# Patient Record
Sex: FEMALE | Race: WHITE | NOT HISPANIC OR LATINO | Employment: UNEMPLOYED | URBAN - METROPOLITAN AREA
[De-identification: names, ages, dates, MRNs, and addresses within clinical notes are randomized per-mention and may not be internally consistent; named-entity substitution may affect disease eponyms.]

---

## 2017-04-23 ENCOUNTER — HOSPITAL ENCOUNTER (OUTPATIENT)
Dept: OBGYN | Facility: HOSPITAL | Age: 22
Discharge: HOME | End: 2017-04-23
Attending: OBSTETRICS & GYNECOLOGY | Admitting: OBSTETRICS & GYNECOLOGY

## 2017-05-24 ENCOUNTER — RECORDS - HEALTHEAST (OUTPATIENT)
Dept: ADMINISTRATIVE | Facility: OTHER | Age: 22
End: 2017-05-24

## 2017-06-25 ENCOUNTER — HOSPITAL ENCOUNTER (OUTPATIENT)
Dept: OBGYN | Facility: HOSPITAL | Age: 22
Discharge: HOME | End: 2017-06-26
Attending: OBSTETRICS & GYNECOLOGY | Admitting: OBSTETRICS & GYNECOLOGY

## 2017-08-01 ENCOUNTER — RECORDS - HEALTHEAST (OUTPATIENT)
Dept: ADMINISTRATIVE | Facility: OTHER | Age: 22
End: 2017-08-01

## 2017-08-11 ASSESSMENT — MIFFLIN-ST. JEOR: SCORE: 2035.43

## 2017-08-13 ENCOUNTER — ANESTHESIA - HEALTHEAST (OUTPATIENT)
Dept: OBGYN | Facility: HOSPITAL | Age: 22
End: 2017-08-13

## 2017-08-14 ENCOUNTER — SURGERY - HEALTHEAST (OUTPATIENT)
Dept: OBGYN | Facility: HOSPITAL | Age: 22
End: 2017-08-14

## 2018-09-14 ENCOUNTER — RECORDS - HEALTHEAST (OUTPATIENT)
Dept: ADMINISTRATIVE | Facility: OTHER | Age: 23
End: 2018-09-14

## 2018-09-18 ASSESSMENT — MIFFLIN-ST. JEOR: SCORE: 1985.53

## 2018-09-19 ENCOUNTER — ANESTHESIA - HEALTHEAST (OUTPATIENT)
Dept: SURGERY | Facility: HOSPITAL | Age: 23
End: 2018-09-19

## 2018-09-19 ENCOUNTER — SURGERY - HEALTHEAST (OUTPATIENT)
Dept: SURGERY | Facility: HOSPITAL | Age: 23
End: 2018-09-19

## 2019-02-05 ENCOUNTER — RECORDS - HEALTHEAST (OUTPATIENT)
Dept: ADMINISTRATIVE | Facility: OTHER | Age: 24
End: 2019-02-05

## 2019-02-05 ASSESSMENT — MIFFLIN-ST. JEOR
SCORE: 2173.78
SCORE: 2178.32

## 2019-02-06 ASSESSMENT — MIFFLIN-ST. JEOR: SCORE: 2160.17

## 2019-02-07 ENCOUNTER — SURGERY - HEALTHEAST (OUTPATIENT)
Dept: OBGYN | Facility: HOSPITAL | Age: 24
End: 2019-02-07

## 2019-02-07 ENCOUNTER — ANESTHESIA - HEALTHEAST (OUTPATIENT)
Dept: OBGYN | Facility: HOSPITAL | Age: 24
End: 2019-02-07

## 2019-02-07 ASSESSMENT — MIFFLIN-ST. JEOR: SCORE: 2173.78

## 2019-03-04 ENCOUNTER — HOME CARE/HOSPICE - HEALTHEAST (OUTPATIENT)
Dept: HOME HEALTH SERVICES | Facility: HOME HEALTH | Age: 24
End: 2019-03-04

## 2020-12-06 ENCOUNTER — RECORDS - HEALTHEAST (OUTPATIENT)
Dept: ADMINISTRATIVE | Facility: OTHER | Age: 25
End: 2020-12-06

## 2020-12-06 ASSESSMENT — MIFFLIN-ST. JEOR: SCORE: 2296.25

## 2020-12-07 ENCOUNTER — COMMUNICATION - HEALTHEAST (OUTPATIENT)
Dept: SCHEDULING | Facility: CLINIC | Age: 25
End: 2020-12-07

## 2020-12-08 ENCOUNTER — ANESTHESIA - HEALTHEAST (OUTPATIENT)
Dept: OBGYN | Facility: HOSPITAL | Age: 25
End: 2020-12-08

## 2020-12-09 ENCOUNTER — SURGERY - HEALTHEAST (OUTPATIENT)
Dept: OBGYN | Facility: HOSPITAL | Age: 25
End: 2020-12-09

## 2021-05-31 VITALS — BODY MASS INDEX: 41.83 KG/M2 | WEIGHT: 276 LBS | HEIGHT: 68 IN

## 2021-06-02 VITALS — HEIGHT: 68 IN | WEIGHT: 265 LBS | BODY MASS INDEX: 40.16 KG/M2

## 2021-06-02 VITALS — HEIGHT: 67 IN | BODY MASS INDEX: 45.99 KG/M2 | WEIGHT: 293 LBS

## 2021-06-04 ENCOUNTER — AMBULATORY - HEALTHEAST (OUTPATIENT)
Dept: SURGERY | Facility: HOSPITAL | Age: 26
End: 2021-06-04

## 2021-06-04 ENCOUNTER — RECORDS - HEALTHEAST (OUTPATIENT)
Dept: ADMINISTRATIVE | Facility: OTHER | Age: 26
End: 2021-06-04

## 2021-06-04 DIAGNOSIS — Z11.59 ENCOUNTER FOR SCREENING FOR OTHER VIRAL DISEASES: ICD-10-CM

## 2021-06-05 VITALS — BODY MASS INDEX: 45.99 KG/M2 | HEIGHT: 67 IN | WEIGHT: 293 LBS

## 2021-06-10 NOTE — PROGRESS NOTES
Natalie presented to [facility] from home to R/O SROM at 21 weeks IUP. ROM plus is negative. Uterus palpates soft, non-tender. No visible fluid on perineum. 's. Dr. RAJINDER Claros notified of above assessments. Order received for discharge.

## 2021-06-11 NOTE — PROGRESS NOTES
Reported ultrasounds results to Dr. Claros at 1:25am. Kiera reports no contractions and none evident on monitor. Received orders for nifedipine 10mg PO every 4 hours as needed. Dr. lCaros to assess patient in AM.

## 2021-06-11 NOTE — PROGRESS NOTES
15 Kiera Iraheta  89    S: Pt presented last PM with q 3-5 min contractions. She also had an elevated blood pressure reading but hadn't taken her evening dose of labetolol. Pt is at 30w 2d.    O: Bp 130/70, P 106, T 98.4  Tracing: normal for 30 weeks 3d.  Pt given SQ terbutaline and then Nifedipine 20mg po and the contractions stopped. FFN was negative and Cx was 3.4cm on U/S.  ROM test negative.  UA showed a trace of protein.    Pt observed through the night and the contractions stopped.    Pelvic exam this AM: Cx is long and closed, -3.    A: Episode of PTL, now resolved. Chronic HTN, treated.    P: Home. RTC Wednesday (2 days.) Call if ROM, labor.    Kuldip Claros MD

## 2021-06-11 NOTE — PROGRESS NOTES
Dr. RAJINDER Claros at bedside for assessment.  MARYLIN per MD.   MD reviewed EFM strip.  Plan of care discussed.  Pt to be discharged to home and will followup with Dr. Claros on Wed as scheduled.  Pt states understanding to monitor fetal movement. And will call if an bleeding LOF, Ctx or concerns.

## 2021-06-12 NOTE — ANESTHESIA PREPROCEDURE EVALUATION
Anesthesia Evaluation      Patient summary reviewed     Airway   Mallampati: II   Pulmonary - negative ROS and normal exam                          Cardiovascular - normal exam  (+) hypertension, ,      Neuro/Psych - negative ROS     Endo/Other    (+) obesity, pregnant     GI/Hepatic/Renal - negative ROS           Dental                         Anesthesia Plan  Planned anesthetic: epidural    ASA 2     Anesthetic plan and risks discussed with: patient    Post-op plan: routine recovery

## 2021-06-12 NOTE — ANESTHESIA CARE TRANSFER NOTE
Last vitals:   Vitals:    08/14/17 0632   BP: 129/66   Pulse: (!) 116   Resp: 12   Temp: 36.9  C (98.4  F)   SpO2: 96%     Patient's level of consciousness is drowsy  Spontaneous respirations: yes  Maintains airway independently: yes  Dentition unchanged: yes  Oropharynx: oropharynx clear of all foreign objects    QCDR Measures:  ASA# 20 - Surgical Safety Checklist: WHO surgical safety checklist completed prior to induction  PQRS# 430 - Adult PONV Prevention: 4558F-8P - Pt did NOT receive => 2 anti-emetic agents  ASA# 8 - Peds PONV Prevention: NA - Not pediatric patient, not GA or 2 or more risk factors NOT present  PQRS# 424 - Hina-op Temp Management: 4559F - At least one body temp DOCUMENTED => 35.5C or 95.9F within required timeframe  PQRS# 426 - PACU Transfer Protocol: - Transfer of care checklist used  ASA# 14 - Acute Post-op Pain: ASA14B - Patient did NOT experience pain >= 7 out of 10

## 2021-06-12 NOTE — ANESTHESIA PROCEDURE NOTES
Epidural Block    Patient location during procedure: OB  Time Called: 8/13/2017 6:35 PM  Reason for Block:labor epidural  Staffing:  Performing  Anesthesiologist: MERI PARKER  Preanesthetic Checklist  Completed: patient identified, risks, benefits, and alternatives discussed, timeout performed, consent obtained, airway assessed, oxygen available, suction available, emergency drugs available and hand hygiene performed  Procedure  Patient position: sitting  Prep: Betadine  Patient monitoring: continuous pulse oximetry, heart rate and blood pressure  Approach: midline  Location: L3-L4  Injection technique: AR saline  Number of Attempts:1  Needle  Needle type: Hesham   Needle gauge: 18 G     Catheter in Space: 3  Assessment  Sensory level:  No complications

## 2021-06-12 NOTE — ANESTHESIA POSTPROCEDURE EVALUATION
Patient: Kiera Iraheta   SECTION  Anesthesia type: epidural    Patient location: OB room 012  Last vitals:   Vitals:    17 1640   BP: 119/65   Pulse: 96   Resp: 18   Temp: 36.7  C (98.1  F)   SpO2: 95%     Post vital signs: stable  Level of consciousness: alert and responsive; evidently had extensive repair per RN and epidural not turned off until 1530.  Pt satisfied with epidural for labor and then C-S.  Post-anesthesia pain: pain controlled  Post-anesthesia nausea and vomiting: no  Pulmonary: unassisted, return to baseline  Cardiovascular: stable and blood pressure at baseline  Hydration: adequate  Anesthetic events: no    QCDR Measures:  ASA# 11 - Hina-op Cardiac Arrest: ASA11B - Patient did NOT experience unanticipated cardiac arrest  ASA# 12 - Hina-op Mortality Rate: ASA12B - Patient did NOT die  ASA# 13 - PACU Re-Intubation Rate: ASA13B - Patient did NOT require a new airway mgmt  ASA# 10 - Composite Anes Safety: ASA10A - No serious adverse event  ASA# 38 - New Corneal Injury: ASA38A - No new exposure keratitis or corneal abrasion in PACU    Additional Notes:

## 2021-06-13 NOTE — ANESTHESIA PREPROCEDURE EVALUATION
Anesthesia Evaluation      Patient summary reviewed   History of anesthetic complications     Airway   Mallampati: III  Neck ROM: limited   Pulmonary - negative ROS and normal exam                          Cardiovascular - normal exam  Exercise tolerance: > or = 4 METS  (+) hypertension, ,      Neuro/Psych    (+) depression, anxiety/panic attacks,     Endo/Other    (+) obesity, pregnant     GI/Hepatic/Renal - negative ROS      Other findings: States only has HTN during pregnancies. 2 previous C-Sections.  May have had poorly functioning LE, and states SAB took a long time to be placed.  States she has been difficult to intubate.  PCOS.  Covid neg 12/7.  GFR and coags nl yesterday.  No pre-eclampsia. BMI 52+.        Dental - normal exam                        Anesthesia Plan  Planned anesthetic: epidural    ASA 3     Anesthetic plan and risks discussed with: patient and spouse    Post-op plan: routine recovery

## 2021-06-13 NOTE — ANESTHESIA PROCEDURE NOTES
Epidural Block    Patient location during procedure: OB  Time Called: 12/8/2020 8:52 PM  Reason for Block:labor epidural  Staffing:  Performing  Anesthesiologist: Jeff Henry MD  Preanesthetic Checklist  Completed: patient identified, risks, benefits, and alternatives discussed, timeout performed, consent obtained, airway assessed, oxygen available, suction available, emergency drugs available and hand hygiene performed  Procedure  Patient position: sitting  Prep: ChloraPrep  Patient monitoring: continuous pulse oximetry, heart rate and blood pressure  Approach: midline  Location: L2-L3  Epidural technique: AR to local (1.5% lido w/epi)  Number of Attempts:1  Needle  Needle type: Hesham   Needle gauge: 18 G     Catheter in Space: 5  Assessment  Sensory level:  No complicationsSensory level: not assessed at this time.

## 2021-06-13 NOTE — ANESTHESIA POSTPROCEDURE EVALUATION
Patient: Kiera Iraheta  Procedure(s):   SECTION  Anesthesia type: epidural    Patient location: Labor and Delivery  Last vitals:   Vitals Value Taken Time   /81 20 1534   Temp 37.4  C (99.3  F) 20 1534   Pulse 100 20 1553   Resp 16 20 1534   SpO2 96 % 20 1553   Vitals shown include unvalidated device data.  Post vital signs: stable  Level of consciousness: awake and responds to simple questions  Post-anesthesia pain: pain controlled  Post-anesthesia nausea and vomiting: no  Pulmonary: unassisted, return to baseline  Cardiovascular: stable and blood pressure at baseline  Hydration: adequate  Anesthetic events: no    QCDR Measures:  ASA# 11 - Hina-op Cardiac Arrest: ASA11B - Patient did NOT experience unanticipated cardiac arrest  ASA# 12 - Hina-op Mortality Rate: ASA12B - Patient did NOT die  ASA# 13 - PACU Re-Intubation Rate: ASA13B - Patient did NOT require a new airway mgmt  ASA# 10 - Composite Anes Safety: ASA10A - No serious adverse event    Additional Notes:

## 2021-06-13 NOTE — ANESTHESIA CARE TRANSFER NOTE
Last vitals:   Vitals:    12/09/20 0937   BP: 123/60   Pulse: 95   Resp: 12   Temp: 36.9  C (98.4  F)   SpO2: 93%     Patient's level of consciousness is awake and drowsy  Spontaneous respirations: yes  Maintains airway independently: yes  Dentition unchanged: yes  Oropharynx: oropharynx clear of all foreign objects    QCDR Measures:  ASA# 20 - Surgical Safety Checklist: WHO surgical safety checklist completed prior to induction    PQRS# 430 - Adult PONV Prevention: 4558F - Pt received => 2 anti-emetic agents (different classes) preop & intraop  ASA# 8 - Peds PONV Prevention: NA - Not pediatric patient, not GA or 2 or more risk factors NOT present  PQRS# 424 - Hina-op Temp Management: 4559F - At least one body temp DOCUMENTED => 35.5C or 95.9F within required timeframe  PQRS# 426 - PACU Transfer Protocol: - Transfer of care checklist used  ASA# 14 - Acute Post-op Pain: ASA14B - Patient did NOT experience pain >= 7 out of 10

## 2021-06-16 PROBLEM — O13.9 PIH (PREGNANCY INDUCED HYPERTENSION): Status: ACTIVE | Noted: 2017-08-02

## 2021-06-16 PROBLEM — O09.40 GESTATIONAL HYPERTENSION AFFECTING EIGHTH PREGNANCY: Status: ACTIVE | Noted: 2019-02-05

## 2021-06-16 PROBLEM — Z34.90 PREGNANT: Status: ACTIVE | Noted: 2020-12-09

## 2021-06-16 PROBLEM — O13.9 GESTATIONAL HYPERTENSION AFFECTING EIGHTH PREGNANCY: Status: ACTIVE | Noted: 2019-02-05

## 2021-06-16 PROBLEM — O13.9 GESTATIONAL HYPERTENSION: Status: ACTIVE | Noted: 2017-08-12

## 2021-06-16 PROBLEM — O13.9 GESTATIONAL HYPERTENSION AFFECTING THIRD PREGNANCY: Status: ACTIVE | Noted: 2020-12-07

## 2021-06-20 ENCOUNTER — HEALTH MAINTENANCE LETTER (OUTPATIENT)
Age: 26
End: 2021-06-20

## 2021-06-20 NOTE — ANESTHESIA POSTPROCEDURE EVALUATION
Patient: Kiera BALDERAS CERCLAGE  Anesthesia type: general    Patient location: PACU  Last vitals:   Vitals:    09/19/18 1400   BP: 141/69   Pulse: 84   Resp:    Temp:    SpO2: 95%     Post vital signs: stable  Level of consciousness: awake and responds to simple questions  Post-anesthesia pain: pain controlled  Post-anesthesia nausea and vomiting: no  Pulmonary: unassisted, return to baseline  Cardiovascular: stable and blood pressure at baseline  Hydration: adequate  Anesthetic events: no    QCDR Measures:  ASA# 11 - Hina-op Cardiac Arrest: ASA11B - Patient did NOT experience unanticipated cardiac arrest  ASA# 12 - Hina-op Mortality Rate: ASA12B - Patient did NOT die  ASA# 13 - PACU Re-Intubation Rate: ASA13B - Patient did NOT require a new airway mgmt  ASA# 10 - Composite Anes Safety: ASA10A - No serious adverse event    Additional Notes:

## 2021-06-20 NOTE — ANESTHESIA PREPROCEDURE EVALUATION
Anesthesia Evaluation      Patient summary reviewed   No history of anesthetic complications     Airway   Mallampati: III  Neck ROM: full   Pulmonary - negative ROS and normal exam                          Cardiovascular - normal exam  (+) hypertension, ,      Neuro/Psych    (+) depression, anxiety/panic attacks,     Endo/Other    (+) obesity (BMI 40), pregnant (Triplet pregnancy.  Hx previous . 13 weeks.)  (-) no diabetes     Comments: PCOS    GI/Hepatic/Renal - negative ROS   (-) GERD          Dental - normal exam                        Anesthesia Plan  Planned anesthetic: general endotracheal  No versed  Glidescope intubation  Assure neuromuscular blockade prior to intubation  Maintain muscle relaxation with zemuron  Zofran  Decadron 4 mg IV  ASA 3   Induction: intravenous   Anesthetic plan and risks discussed with: patient and spouse  Anesthesia plan special considerations: video-assisted, antiemetics,   Post-op plan: routine recovery

## 2021-06-20 NOTE — ANESTHESIA CARE TRANSFER NOTE
Last vitals:   Vitals:    09/19/18 1320   BP: 123/57   Pulse: 92   Resp: 27   Temp:    SpO2: 94%     Patient's level of consciousness is awake  Spontaneous respirations: yes  Maintains airway independently: yes  Dentition unchanged: yes  Oropharynx: oropharynx clear of all foreign objects    QCDR Measures:  ASA# 20 - Surgical Safety Checklist: WHO surgical safety checklist completed prior to induction  PQRS# 430 - Adult PONV Prevention: 4558F - Pt received => 2 anti-emetic agents (different classes) preop & intraop  ASA# 8 - Peds PONV Prevention: NA - Not pediatric patient, not GA or 2 or more risk factors NOT present  PQRS# 424 - Hina-op Temp Management: 4559F - At least one body temp DOCUMENTED => 35.5C or 95.9F within required timeframe  PQRS# 426 - PACU Transfer Protocol: - Transfer of care checklist used  ASA# 14 - Acute Post-op Pain: ASA14B - Patient did NOT experience pain >= 7 out of 10

## 2021-06-23 NOTE — ANESTHESIA PROCEDURE NOTES
Peripheral Block    Patient location during procedure: OR  Start time: 2/7/2019 7:00 PM  End time: 2/7/2019 7:08 PM  post-op analgesia per surgeon order as noted in medical record  Staffing:  Performing  Anesthesiologist: Pramod Jovel MD  Performing CRNA: Claudia Zaidi CRNA  Preanesthetic Checklist  Completed: patient identified, site marked, risks, benefits, and alternatives discussed, timeout performed, consent obtained, at patient's request, airway assessed, oxygen available, suction available, emergency drugs available and hand hygiene performed  Peripheral Block  Block type: other, TAP  Prep: ChloraPrep  Patient position: supine  Patient monitoring: cardiac monitor, continuous pulse oximetry, heart rate and blood pressure  Laterality: bilateral  Injection technique: ultrasound guided            Needle  Needle type: Stimuplex   Needle gauge: 22 G  Needle length: 6 in  no peripheral nerve catheter placed  Assessment  Injection assessment: no difficulty with injection, incremental injection, negative aspiration for heme and no paresthesia on injection  Additional Notes  onofre well

## 2021-06-23 NOTE — ANESTHESIA PREPROCEDURE EVALUATION
Anesthesia Evaluation      Patient summary reviewed     Airway   Mallampati: II  Neck ROM: full   Pulmonary - negative ROS    breath sounds clear to auscultation                         Cardiovascular   Rhythm: regular  Rate: normal,         Neuro/Psych - negative ROS     Endo/Other    (+) obesity,      GI/Hepatic/Renal       Other findings: PONV, TRIPLETS      Dental - normal exam                        Anesthesia Plan  Planned anesthetic: spinal  Tap block if requested, TXA  ASA 3     Anesthetic plan and risks discussed with: patient  Anesthesia plan special considerations: IV therapy two IVs,   Post-op plan: routine recovery

## 2021-06-23 NOTE — ANESTHESIA POSTPROCEDURE EVALUATION
Patient: Kiera Iraheta   SECTION, REPEAT  Anesthesia type: spinal    Patient location: Labor and Delivery  Last vitals:   Vitals:    19 1255   BP: 135/76   Pulse: 92   Resp: 18   Temp: 36.6  C (97.8  F)   SpO2: 95%     Post vital signs: stable  Level of consciousness: awake and responds to simple questions  Post-anesthesia pain: pain controlled  Post-anesthesia nausea and vomiting: no  Pulmonary: unassisted  Cardiovascular: stable  Hydration: adequate  Anesthetic events: no    QCDR Measures:  ASA# 11 - Hina-op Cardiac Arrest: ASA11B - Patient did NOT experience unanticipated cardiac arrest  ASA# 12 - Hina-op Mortality Rate: ASA12B - Patient did NOT die  ASA# 13 - PACU Re-Intubation Rate: NA - No ETT / LMA used for case  ASA# 10 - Composite Anes Safety: ASA10A - No serious adverse event    Additional Notes:

## 2021-06-23 NOTE — ANESTHESIA CARE TRANSFER NOTE
Last vitals:   Vitals:    02/07/19 1928   BP: 141/87   Pulse: 94   Resp: 12   Temp: 36.6  C (97.9  F)   SpO2: 95%     Patient's level of consciousness is drowsy  Spontaneous respirations: yes  Maintains airway independently: yes  Dentition unchanged: yes  Oropharynx: oropharynx clear of all foreign objects    QCDR Measures:  ASA# 20 - Surgical Safety Checklist: WHO surgical safety checklist completed prior to induction    PQRS# 430 - Adult PONV Prevention: 4558F - Pt received => 2 anti-emetic agents (different classes) preop & intraop  ASA# 8 - Peds PONV Prevention: NA - Not pediatric patient, not GA or 2 or more risk factors NOT present  PQRS# 424 - Hina-op Temp Management: 4559F - At least one body temp DOCUMENTED => 35.5C or 95.9F within required timeframe  PQRS# 426 - PACU Transfer Protocol: - Transfer of care checklist used  ASA# 14 - Acute Post-op Pain: ASA14B - Patient did NOT experience pain >= 7 out of 10

## 2021-06-23 NOTE — ANESTHESIA PROCEDURE NOTES
Spinal Block    Patient location during procedure: OR  Start time: 2/7/2019 5:16 PM  End time: 2/7/2019 5:46 PM  Reason for block: primary anesthetic      Preanesthetic Checklist  Completed: patient identified, risks, benefits, and alternatives discussed, timeout performed, consent obtained, at patient's request, airway assessed, oxygen available, suction available, emergency drugs available and hand hygiene performed  Spinal Block  Patient position: sitting  Prep: ChloraPrep  Patient monitoring: heart rate, cardiac monitor, continuous pulse ox and blood pressure  Approach: right paramedian  Location: L4-5  Injection technique: single-shot  Needle type: pencil-tip   Needle gauge: 22 G    Assessment  Sensory level: T8    Additional Notes:  Difficult placement secundary to anatomy and size, onofre well.

## 2021-06-26 ENCOUNTER — HOSPITAL ENCOUNTER (OUTPATIENT)
Facility: HOSPITAL | Age: 26
End: 2021-06-26
Attending: OBSTETRICS & GYNECOLOGY | Admitting: OBSTETRICS & GYNECOLOGY
Payer: COMMERCIAL

## 2021-06-27 DIAGNOSIS — Z11.59 ENCOUNTER FOR SCREENING FOR OTHER VIRAL DISEASES: ICD-10-CM

## 2021-07-03 NOTE — ADDENDUM NOTE
Addendum Note by Eleuterio Acevedo MD at 8/17/2017  8:47 AM     Author: Eleuterio Acevedo MD Service: -- Author Type: Physician    Filed: 8/17/2017  8:47 AM Date of Service: 8/17/2017  8:47 AM Status: Signed    : Eleuterio Acevedo MD (Physician)       Addendum  created 08/17/17 0847 by Eleuterio Acevedo MD    Anesthesia Intra Blocks edited, LDA updated via procedure documentation, Sign clinical note

## 2021-07-03 NOTE — ADDENDUM NOTE
Addendum Note by Eleuterio Acevedo MD at 8/17/2017  8:32 AM     Author: Eleuterio Acevedo MD Service: -- Author Type: Physician    Filed: 8/17/2017  8:32 AM Date of Service: 8/17/2017  8:32 AM Status: Signed    : Eleuterio Acevedo MD (Physician)       Addendum  created 08/17/17 0832 by Eleuterio Acevedo MD    Anesthesia Intra Blocks edited, Child order released for a procedure order, LDA created via procedure documentation, Sign clinical note

## 2021-07-23 ENCOUNTER — MEDICAL CORRESPONDENCE (OUTPATIENT)
Dept: HEALTH INFORMATION MANAGEMENT | Facility: CLINIC | Age: 26
End: 2021-07-23
Payer: COMMERCIAL

## 2021-10-10 ENCOUNTER — HEALTH MAINTENANCE LETTER (OUTPATIENT)
Age: 26
End: 2021-10-10

## 2022-01-28 LAB
ABO (EXTERNAL): NORMAL
ABO (EXTERNAL): NORMAL
HEMOGLOBIN (EXTERNAL): 13.7 G/DL (ref 11.7–15.5)
HEPATITIS B SURFACE ANTIGEN (EXTERNAL): NONREACTIVE
HEPATITIS C ANTIBODY (EXTERNAL): NONREACTIVE
HIV1+2 AB SERPL QL IA: NONREACTIVE
HIV1+2 AB SERPL QL IA: NONREACTIVE
PLATELET COUNT (EXTERNAL): 380 10E3/UL (ref 140–400)
RH (EXTERNAL): POSITIVE
RH (EXTERNAL): POSITIVE
RUBELLA ANTIBODY IGG (EXTERNAL): NORMAL
VDRL (SYPHILIS) (EXTERNAL): NONREACTIVE

## 2022-07-16 ENCOUNTER — HEALTH MAINTENANCE LETTER (OUTPATIENT)
Age: 27
End: 2022-07-16

## 2022-07-26 ENCOUNTER — TRANSFERRED RECORDS (OUTPATIENT)
Dept: OBGYN | Facility: HOSPITAL | Age: 27
End: 2022-07-26

## 2022-07-26 ENCOUNTER — APPOINTMENT (OUTPATIENT)
Dept: ULTRASOUND IMAGING | Facility: HOSPITAL | Age: 27
End: 2022-07-26
Attending: OBSTETRICS & GYNECOLOGY
Payer: COMMERCIAL

## 2022-07-26 ENCOUNTER — HOSPITAL ENCOUNTER (INPATIENT)
Facility: HOSPITAL | Age: 27
LOS: 3 days | Discharge: HOME | End: 2022-07-29
Attending: OBSTETRICS & GYNECOLOGY | Admitting: OBSTETRICS & GYNECOLOGY
Payer: COMMERCIAL

## 2022-07-26 PROBLEM — O28.8 NON-REACTIVE NST (NON-STRESS TEST): Status: ACTIVE | Noted: 2022-07-26

## 2022-07-26 LAB
ABO/RH(D): NORMAL
ALBUMIN MFR UR ELPH: 10 MG/DL
ALT SERPL W P-5'-P-CCNC: 13 U/L (ref 0–45)
ANION GAP SERPL CALCULATED.3IONS-SCNC: 11 MMOL/L (ref 5–18)
ANTIBODY SCREEN: NEGATIVE
APTT PPP: 25 SECONDS (ref 22–38)
AST SERPL W P-5'-P-CCNC: 15 U/L (ref 0–40)
BUN SERPL-MCNC: 7 MG/DL (ref 8–22)
CALCIUM SERPL-MCNC: 9.4 MG/DL (ref 8.5–10.5)
CHLORIDE BLD-SCNC: 105 MMOL/L (ref 98–107)
CO2 SERPL-SCNC: 21 MMOL/L (ref 22–31)
CREAT SERPL-MCNC: 0.63 MG/DL (ref 0.6–1.1)
CREAT UR-MCNC: 107 MG/DL
ERYTHROCYTE [DISTWIDTH] IN BLOOD BY AUTOMATED COUNT: 13.8 % (ref 10–15)
GFR SERPL CREATININE-BSD FRML MDRD: >90 ML/MIN/1.73M2
GLUCOSE BLD-MCNC: 111 MG/DL (ref 70–125)
HCT VFR BLD AUTO: 34.8 % (ref 35–47)
HGB BLD-MCNC: 11.4 G/DL (ref 11.7–15.7)
INR PPP: 1.01 (ref 0.85–1.15)
MCH RBC QN AUTO: 27.9 PG (ref 26.5–33)
MCHC RBC AUTO-ENTMCNC: 32.8 G/DL (ref 31.5–36.5)
MCV RBC AUTO: 85 FL (ref 78–100)
PLATELET # BLD AUTO: 355 10E3/UL (ref 150–450)
POTASSIUM BLD-SCNC: 3.9 MMOL/L (ref 3.5–5)
PROT/CREAT 24H UR: 0.09 MG/MG CR
RBC # BLD AUTO: 4.08 10E6/UL (ref 3.8–5.2)
SARS-COV-2 RNA RESP QL NAA+PROBE: NEGATIVE
SODIUM SERPL-SCNC: 137 MMOL/L (ref 136–145)
SPECIMEN EXPIRATION DATE: NORMAL
URATE SERPL-MCNC: 6.4 MG/DL (ref 2–7.5)
WBC # BLD AUTO: 13.7 10E3/UL (ref 4–11)

## 2022-07-26 PROCEDURE — 86850 RBC ANTIBODY SCREEN: CPT | Performed by: OBSTETRICS & GYNECOLOGY

## 2022-07-26 PROCEDURE — 85027 COMPLETE CBC AUTOMATED: CPT | Performed by: OBSTETRICS & GYNECOLOGY

## 2022-07-26 PROCEDURE — 84460 ALANINE AMINO (ALT) (SGPT): CPT | Performed by: OBSTETRICS & GYNECOLOGY

## 2022-07-26 PROCEDURE — 250N000013 HC RX MED GY IP 250 OP 250 PS 637: Performed by: OBSTETRICS & GYNECOLOGY

## 2022-07-26 PROCEDURE — 250N000011 HC RX IP 250 OP 636: Performed by: OBSTETRICS & GYNECOLOGY

## 2022-07-26 PROCEDURE — 120N000001 HC R&B MED SURG/OB

## 2022-07-26 PROCEDURE — 86780 TREPONEMA PALLIDUM: CPT | Performed by: OBSTETRICS & GYNECOLOGY

## 2022-07-26 PROCEDURE — 85610 PROTHROMBIN TIME: CPT | Performed by: OBSTETRICS & GYNECOLOGY

## 2022-07-26 PROCEDURE — 85730 THROMBOPLASTIN TIME PARTIAL: CPT | Performed by: OBSTETRICS & GYNECOLOGY

## 2022-07-26 PROCEDURE — 84550 ASSAY OF BLOOD/URIC ACID: CPT | Performed by: OBSTETRICS & GYNECOLOGY

## 2022-07-26 PROCEDURE — 76819 FETAL BIOPHYS PROFIL W/O NST: CPT

## 2022-07-26 PROCEDURE — 80048 BASIC METABOLIC PNL TOTAL CA: CPT | Performed by: OBSTETRICS & GYNECOLOGY

## 2022-07-26 PROCEDURE — 87635 SARS-COV-2 COVID-19 AMP PRB: CPT | Performed by: OBSTETRICS & GYNECOLOGY

## 2022-07-26 PROCEDURE — 36415 COLL VENOUS BLD VENIPUNCTURE: CPT | Performed by: OBSTETRICS & GYNECOLOGY

## 2022-07-26 PROCEDURE — 84450 TRANSFERASE (AST) (SGOT): CPT | Performed by: OBSTETRICS & GYNECOLOGY

## 2022-07-26 PROCEDURE — 84156 ASSAY OF PROTEIN URINE: CPT | Performed by: OBSTETRICS & GYNECOLOGY

## 2022-07-26 PROCEDURE — 258N000003 HC RX IP 258 OP 636: Performed by: OBSTETRICS & GYNECOLOGY

## 2022-07-26 RX ORDER — OXYTOCIN 10 [USP'U]/ML
10 INJECTION, SOLUTION INTRAMUSCULAR; INTRAVENOUS
Status: DISCONTINUED | OUTPATIENT
Start: 2022-07-26 | End: 2022-07-27 | Stop reason: HOSPADM

## 2022-07-26 RX ORDER — ASPIRIN 81 MG/1
81 TABLET, CHEWABLE ORAL DAILY
Status: ON HOLD | COMMUNITY
End: 2023-10-26

## 2022-07-26 RX ORDER — OXYTOCIN/0.9 % SODIUM CHLORIDE 30/500 ML
340 PLASTIC BAG, INJECTION (ML) INTRAVENOUS CONTINUOUS PRN
Status: DISCONTINUED | OUTPATIENT
Start: 2022-07-26 | End: 2022-07-27 | Stop reason: HOSPADM

## 2022-07-26 RX ORDER — IBUPROFEN 800 MG/1
800 TABLET, FILM COATED ORAL
Status: DISCONTINUED | OUTPATIENT
Start: 2022-07-26 | End: 2022-07-29 | Stop reason: HOSPADM

## 2022-07-26 RX ORDER — LIDOCAINE 40 MG/G
CREAM TOPICAL
Status: DISCONTINUED | OUTPATIENT
Start: 2022-07-26 | End: 2022-07-27 | Stop reason: HOSPADM

## 2022-07-26 RX ORDER — NALOXONE HYDROCHLORIDE 0.4 MG/ML
0.2 INJECTION, SOLUTION INTRAMUSCULAR; INTRAVENOUS; SUBCUTANEOUS
Status: DISCONTINUED | OUTPATIENT
Start: 2022-07-26 | End: 2022-07-27 | Stop reason: HOSPADM

## 2022-07-26 RX ORDER — MAGNESIUM SULFATE HEPTAHYDRATE 40 MG/ML
2 INJECTION, SOLUTION INTRAVENOUS
Status: DISCONTINUED | OUTPATIENT
Start: 2022-07-26 | End: 2022-07-29 | Stop reason: HOSPADM

## 2022-07-26 RX ORDER — NALOXONE HYDROCHLORIDE 0.4 MG/ML
0.4 INJECTION, SOLUTION INTRAMUSCULAR; INTRAVENOUS; SUBCUTANEOUS
Status: DISCONTINUED | OUTPATIENT
Start: 2022-07-26 | End: 2022-07-27 | Stop reason: HOSPADM

## 2022-07-26 RX ORDER — LORAZEPAM 2 MG/ML
2 INJECTION INTRAMUSCULAR
Status: DISCONTINUED | OUTPATIENT
Start: 2022-07-26 | End: 2022-07-29 | Stop reason: HOSPADM

## 2022-07-26 RX ORDER — KETOROLAC TROMETHAMINE 30 MG/ML
30 INJECTION, SOLUTION INTRAMUSCULAR; INTRAVENOUS
Status: DISCONTINUED | OUTPATIENT
Start: 2022-07-26 | End: 2022-07-29 | Stop reason: HOSPADM

## 2022-07-26 RX ORDER — OXYTOCIN/0.9 % SODIUM CHLORIDE 30/500 ML
100-340 PLASTIC BAG, INJECTION (ML) INTRAVENOUS CONTINUOUS PRN
Status: DISCONTINUED | OUTPATIENT
Start: 2022-07-26 | End: 2022-07-29 | Stop reason: HOSPADM

## 2022-07-26 RX ORDER — HYDROXYZINE HYDROCHLORIDE 50 MG/1
200 TABLET, FILM COATED ORAL ONCE
Status: COMPLETED | OUTPATIENT
Start: 2022-07-26 | End: 2022-07-26

## 2022-07-26 RX ORDER — CITRIC ACID/SODIUM CITRATE 334-500MG
30 SOLUTION, ORAL ORAL
Status: DISCONTINUED | OUTPATIENT
Start: 2022-07-26 | End: 2022-07-27 | Stop reason: HOSPADM

## 2022-07-26 RX ORDER — ONDANSETRON 4 MG/1
4 TABLET, ORALLY DISINTEGRATING ORAL EVERY 6 HOURS PRN
Status: DISCONTINUED | OUTPATIENT
Start: 2022-07-26 | End: 2022-07-27 | Stop reason: HOSPADM

## 2022-07-26 RX ORDER — BETAMETHASONE SODIUM PHOSPHATE AND BETAMETHASONE ACETATE 3; 3 MG/ML; MG/ML
12 INJECTION, SUSPENSION INTRA-ARTICULAR; INTRALESIONAL; INTRAMUSCULAR; SOFT TISSUE ONCE
Status: COMPLETED | OUTPATIENT
Start: 2022-07-26 | End: 2022-07-26

## 2022-07-26 RX ORDER — ONDANSETRON 2 MG/ML
4 INJECTION INTRAMUSCULAR; INTRAVENOUS EVERY 6 HOURS PRN
Status: DISCONTINUED | OUTPATIENT
Start: 2022-07-26 | End: 2022-07-27 | Stop reason: HOSPADM

## 2022-07-26 RX ORDER — MORPHINE SULFATE 10 MG/ML
10 INJECTION, SOLUTION INTRAMUSCULAR; INTRAVENOUS ONCE
Status: COMPLETED | OUTPATIENT
Start: 2022-07-27 | End: 2022-07-27

## 2022-07-26 RX ORDER — MAGNESIUM SULFATE 4 G/50ML
4 INJECTION INTRAVENOUS
Status: DISCONTINUED | OUTPATIENT
Start: 2022-07-26 | End: 2022-07-29 | Stop reason: HOSPADM

## 2022-07-26 RX ORDER — NIFEDIPINE 10 MG/1
10-20 CAPSULE ORAL
Status: DISCONTINUED | OUTPATIENT
Start: 2022-07-26 | End: 2022-07-29 | Stop reason: HOSPADM

## 2022-07-26 RX ORDER — CARBOPROST TROMETHAMINE 250 UG/ML
250 INJECTION, SOLUTION INTRAMUSCULAR
Status: DISCONTINUED | OUTPATIENT
Start: 2022-07-26 | End: 2022-07-27 | Stop reason: HOSPADM

## 2022-07-26 RX ORDER — MAGNESIUM SULFATE HEPTAHYDRATE 500 MG/ML
10 INJECTION, SOLUTION INTRAMUSCULAR; INTRAVENOUS
Status: DISCONTINUED | OUTPATIENT
Start: 2022-07-26 | End: 2022-07-29 | Stop reason: HOSPADM

## 2022-07-26 RX ORDER — ACETAMINOPHEN 325 MG/1
650 TABLET ORAL EVERY 4 HOURS PRN
Status: DISCONTINUED | OUTPATIENT
Start: 2022-07-26 | End: 2022-07-27 | Stop reason: HOSPADM

## 2022-07-26 RX ORDER — LABETALOL HYDROCHLORIDE 5 MG/ML
20 INJECTION, SOLUTION INTRAVENOUS
Status: DISCONTINUED | OUTPATIENT
Start: 2022-07-26 | End: 2022-07-29 | Stop reason: HOSPADM

## 2022-07-26 RX ADMIN — BETAMETHASONE SODIUM PHOSPHATE AND BETAMETHASONE ACETATE 12 MG: 3; 3 INJECTION, SUSPENSION INTRA-ARTICULAR; INTRALESIONAL; INTRAMUSCULAR at 17:36

## 2022-07-26 RX ADMIN — HYDROXYZINE HYDROCHLORIDE 200 MG: 50 TABLET, FILM COATED ORAL at 21:32

## 2022-07-26 RX ADMIN — ACETAMINOPHEN 650 MG: 325 TABLET ORAL at 21:31

## 2022-07-26 RX ADMIN — LABETALOL HYDROCHLORIDE 300 MG: 100 TABLET, FILM COATED ORAL at 20:31

## 2022-07-26 RX ADMIN — SODIUM CHLORIDE, POTASSIUM CHLORIDE, SODIUM LACTATE AND CALCIUM CHLORIDE 1000 ML: 600; 310; 30; 20 INJECTION, SOLUTION INTRAVENOUS at 18:46

## 2022-07-26 ASSESSMENT — ACTIVITIES OF DAILY LIVING (ADL)
FALL_HISTORY_WITHIN_LAST_SIX_MONTHS: NO
CHANGE_IN_FUNCTIONAL_STATUS_SINCE_ONSET_OF_CURRENT_ILLNESS/INJURY: NO
DRESSING/BATHING_DIFFICULTY: NO
WEAR_GLASSES_OR_BLIND: NO
ADLS_ACUITY_SCORE: 18
ADLS_ACUITY_SCORE: 35
DIFFICULTY_EATING/SWALLOWING: NO
ADLS_ACUITY_SCORE: 18
TOILETING_ISSUES: NO
DOING_ERRANDS_INDEPENDENTLY_DIFFICULTY: NO
WALKING_OR_CLIMBING_STAIRS_DIFFICULTY: NO
ADLS_ACUITY_SCORE: 18
CONCENTRATING,_REMEMBERING_OR_MAKING_DECISIONS_DIFFICULTY: NO

## 2022-07-26 NOTE — PROGRESS NOTES
Pt presented from the clinic for NST/BPP after non reactive NST in clinic and 6/8 bpp.    NST is non reactive BPP 8/8, informed Dr. Claros order received for 12 mg Betamethasone IM. He is enroute to see pt.

## 2022-07-26 NOTE — PROGRESS NOTES
Fluid bolus started at 1846, pt off monitor to eat dinner. Fetal monitoring difficult while sitting up, will continue to adjust and monitor.

## 2022-07-26 NOTE — PROGRESS NOTES
Dr. Claros at bedside reviewed strip, plan as of now is to admit pt for continuous monitoring, second Beta tomorrow and C/S on Thursday or sooner if needed. Dr. Claros discussed plan with pt and her spouse, they understand and agree with plan of care.

## 2022-07-27 ENCOUNTER — ANESTHESIA EVENT (OUTPATIENT)
Dept: OBGYN | Facility: HOSPITAL | Age: 27
End: 2022-07-27
Payer: COMMERCIAL

## 2022-07-27 ENCOUNTER — ANESTHESIA (OUTPATIENT)
Dept: OBGYN | Facility: HOSPITAL | Age: 27
End: 2022-07-27
Payer: COMMERCIAL

## 2022-07-27 LAB
APTT PPP: 24 SECONDS (ref 22–38)
INR PPP: 0.99 (ref 0.85–1.15)
PLATELET # BLD AUTO: 408 10E3/UL (ref 150–450)
T PALLIDUM AB SER QL: NONREACTIVE

## 2022-07-27 PROCEDURE — 258N000003 HC RX IP 258 OP 636

## 2022-07-27 PROCEDURE — 999N000127 HC STATISTIC PERIPHERAL IV START W US GUIDANCE

## 2022-07-27 PROCEDURE — 250N000011 HC RX IP 250 OP 636: Performed by: OBSTETRICS & GYNECOLOGY

## 2022-07-27 PROCEDURE — 360N000076 HC SURGERY LEVEL 3, PER MIN: Performed by: OBSTETRICS & GYNECOLOGY

## 2022-07-27 PROCEDURE — 999N000285 HC STATISTIC VASC ACCESS LAB DRAW WITH PIV START

## 2022-07-27 PROCEDURE — 999N000249 HC STATISTIC C-SECTION ON UNIT

## 2022-07-27 PROCEDURE — 250N000011 HC RX IP 250 OP 636: Performed by: ANESTHESIOLOGY

## 2022-07-27 PROCEDURE — C9290 INJ, BUPIVACAINE LIPOSOME: HCPCS | Performed by: ANESTHESIOLOGY

## 2022-07-27 PROCEDURE — 272N000001 HC OR GENERAL SUPPLY STERILE: Performed by: OBSTETRICS & GYNECOLOGY

## 2022-07-27 PROCEDURE — 250N000011 HC RX IP 250 OP 636

## 2022-07-27 PROCEDURE — 85049 AUTOMATED PLATELET COUNT: CPT | Performed by: OBSTETRICS & GYNECOLOGY

## 2022-07-27 PROCEDURE — 120N000001 HC R&B MED SURG/OB

## 2022-07-27 PROCEDURE — 36415 COLL VENOUS BLD VENIPUNCTURE: CPT | Performed by: OBSTETRICS & GYNECOLOGY

## 2022-07-27 PROCEDURE — 250N000009 HC RX 250

## 2022-07-27 PROCEDURE — 250N000013 HC RX MED GY IP 250 OP 250 PS 637: Performed by: ANESTHESIOLOGY

## 2022-07-27 PROCEDURE — 85730 THROMBOPLASTIN TIME PARTIAL: CPT | Performed by: OBSTETRICS & GYNECOLOGY

## 2022-07-27 PROCEDURE — 258N000003 HC RX IP 258 OP 636: Performed by: OBSTETRICS & GYNECOLOGY

## 2022-07-27 PROCEDURE — 85610 PROTHROMBIN TIME: CPT | Performed by: OBSTETRICS & GYNECOLOGY

## 2022-07-27 PROCEDURE — 250N000013 HC RX MED GY IP 250 OP 250 PS 637: Performed by: OBSTETRICS & GYNECOLOGY

## 2022-07-27 PROCEDURE — 370N000017 HC ANESTHESIA TECHNICAL FEE, PER MIN: Performed by: OBSTETRICS & GYNECOLOGY

## 2022-07-27 RX ORDER — SODIUM CHLORIDE, SODIUM LACTATE, POTASSIUM CHLORIDE, CALCIUM CHLORIDE 600; 310; 30; 20 MG/100ML; MG/100ML; MG/100ML; MG/100ML
INJECTION, SOLUTION INTRAVENOUS CONTINUOUS
Status: DISCONTINUED | OUTPATIENT
Start: 2022-07-27 | End: 2022-07-29 | Stop reason: HOSPADM

## 2022-07-27 RX ORDER — OXYTOCIN 10 [USP'U]/ML
10 INJECTION, SOLUTION INTRAMUSCULAR; INTRAVENOUS
Status: DISCONTINUED | OUTPATIENT
Start: 2022-07-27 | End: 2022-07-29 | Stop reason: HOSPADM

## 2022-07-27 RX ORDER — FENTANYL CITRATE 50 UG/ML
25 INJECTION, SOLUTION INTRAMUSCULAR; INTRAVENOUS EVERY 5 MIN PRN
Status: DISCONTINUED | OUTPATIENT
Start: 2022-07-27 | End: 2022-07-29 | Stop reason: HOSPADM

## 2022-07-27 RX ORDER — OXYTOCIN/0.9 % SODIUM CHLORIDE 30/500 ML
100-340 PLASTIC BAG, INJECTION (ML) INTRAVENOUS CONTINUOUS PRN
Status: DISCONTINUED | OUTPATIENT
Start: 2022-07-27 | End: 2022-07-29 | Stop reason: HOSPADM

## 2022-07-27 RX ORDER — KETOROLAC TROMETHAMINE 30 MG/ML
30 INJECTION, SOLUTION INTRAMUSCULAR; INTRAVENOUS EVERY 6 HOURS
Status: COMPLETED | OUTPATIENT
Start: 2022-07-27 | End: 2022-07-28

## 2022-07-27 RX ORDER — OXYTOCIN/0.9 % SODIUM CHLORIDE 30/500 ML
340 PLASTIC BAG, INJECTION (ML) INTRAVENOUS CONTINUOUS PRN
Status: DISCONTINUED | OUTPATIENT
Start: 2022-07-27 | End: 2022-07-27 | Stop reason: HOSPADM

## 2022-07-27 RX ORDER — SODIUM CHLORIDE, SODIUM LACTATE, POTASSIUM CHLORIDE, CALCIUM CHLORIDE 600; 310; 30; 20 MG/100ML; MG/100ML; MG/100ML; MG/100ML
INJECTION, SOLUTION INTRAVENOUS CONTINUOUS
Status: DISCONTINUED | OUTPATIENT
Start: 2022-07-27 | End: 2022-07-27 | Stop reason: HOSPADM

## 2022-07-27 RX ORDER — BUPIVACAINE HYDROCHLORIDE 7.5 MG/ML
INJECTION, SOLUTION INTRASPINAL
Status: COMPLETED | OUTPATIENT
Start: 2022-07-27 | End: 2022-07-27

## 2022-07-27 RX ORDER — OXYTOCIN/0.9 % SODIUM CHLORIDE 30/500 ML
PLASTIC BAG, INJECTION (ML) INTRAVENOUS CONTINUOUS PRN
Status: DISCONTINUED | OUTPATIENT
Start: 2022-07-27 | End: 2022-07-27

## 2022-07-27 RX ORDER — BUPIVACAINE HYDROCHLORIDE 2.5 MG/ML
INJECTION, SOLUTION EPIDURAL; INFILTRATION; INTRACAUDAL
Status: DISCONTINUED | OUTPATIENT
Start: 2022-07-27 | End: 2022-07-27

## 2022-07-27 RX ORDER — DEXTROSE, SODIUM CHLORIDE, SODIUM LACTATE, POTASSIUM CHLORIDE, AND CALCIUM CHLORIDE 5; .6; .31; .03; .02 G/100ML; G/100ML; G/100ML; G/100ML; G/100ML
INJECTION, SOLUTION INTRAVENOUS CONTINUOUS
Status: DISCONTINUED | OUTPATIENT
Start: 2022-07-27 | End: 2022-07-29 | Stop reason: HOSPADM

## 2022-07-27 RX ORDER — AMOXICILLIN 250 MG
2 CAPSULE ORAL 2 TIMES DAILY
Status: DISCONTINUED | OUTPATIENT
Start: 2022-07-27 | End: 2022-07-29 | Stop reason: HOSPADM

## 2022-07-27 RX ORDER — CARBOPROST TROMETHAMINE 250 UG/ML
250 INJECTION, SOLUTION INTRAMUSCULAR
Status: DISCONTINUED | OUTPATIENT
Start: 2022-07-27 | End: 2022-07-27 | Stop reason: HOSPADM

## 2022-07-27 RX ORDER — ACETAMINOPHEN 325 MG/1
975 TABLET ORAL EVERY 6 HOURS
Status: DISCONTINUED | OUTPATIENT
Start: 2022-07-27 | End: 2022-07-29 | Stop reason: HOSPADM

## 2022-07-27 RX ORDER — OXYCODONE HYDROCHLORIDE 5 MG/1
5-10 TABLET ORAL EVERY 4 HOURS PRN
Status: DISCONTINUED | OUTPATIENT
Start: 2022-07-27 | End: 2022-07-29 | Stop reason: HOSPADM

## 2022-07-27 RX ORDER — CEFAZOLIN SODIUM/WATER 3 G/30 ML
3 SYRINGE (ML) INTRAVENOUS
Status: COMPLETED | OUTPATIENT
Start: 2022-07-27 | End: 2022-07-27

## 2022-07-27 RX ORDER — ONDANSETRON 4 MG/1
4 TABLET, ORALLY DISINTEGRATING ORAL EVERY 6 HOURS PRN
Status: DISCONTINUED | OUTPATIENT
Start: 2022-07-27 | End: 2022-07-29 | Stop reason: HOSPADM

## 2022-07-27 RX ORDER — OXYCODONE HYDROCHLORIDE 5 MG/1
5 TABLET ORAL EVERY 4 HOURS PRN
Status: DISCONTINUED | OUTPATIENT
Start: 2022-07-27 | End: 2022-07-29 | Stop reason: HOSPADM

## 2022-07-27 RX ORDER — CEFAZOLIN SODIUM/WATER 3 G/30 ML
3 SYRINGE (ML) INTRAVENOUS SEE ADMIN INSTRUCTIONS
Status: DISCONTINUED | OUTPATIENT
Start: 2022-07-27 | End: 2022-07-27 | Stop reason: HOSPADM

## 2022-07-27 RX ORDER — NALOXONE HYDROCHLORIDE 0.4 MG/ML
0.4 INJECTION, SOLUTION INTRAMUSCULAR; INTRAVENOUS; SUBCUTANEOUS
Status: DISCONTINUED | OUTPATIENT
Start: 2022-07-27 | End: 2022-07-29 | Stop reason: HOSPADM

## 2022-07-27 RX ORDER — LIDOCAINE 40 MG/G
CREAM TOPICAL
Status: DISCONTINUED | OUTPATIENT
Start: 2022-07-27 | End: 2022-07-27 | Stop reason: HOSPADM

## 2022-07-27 RX ORDER — ONDANSETRON 2 MG/ML
INJECTION INTRAMUSCULAR; INTRAVENOUS PRN
Status: DISCONTINUED | OUTPATIENT
Start: 2022-07-27 | End: 2022-07-27

## 2022-07-27 RX ORDER — OXYTOCIN/0.9 % SODIUM CHLORIDE 30/500 ML
340 PLASTIC BAG, INJECTION (ML) INTRAVENOUS CONTINUOUS PRN
Status: DISCONTINUED | OUTPATIENT
Start: 2022-07-27 | End: 2022-07-29 | Stop reason: HOSPADM

## 2022-07-27 RX ORDER — HYDROCORTISONE 25 MG/G
CREAM TOPICAL 3 TIMES DAILY PRN
Status: DISCONTINUED | OUTPATIENT
Start: 2022-07-27 | End: 2022-07-29 | Stop reason: HOSPADM

## 2022-07-27 RX ORDER — IBUPROFEN 800 MG/1
800 TABLET, FILM COATED ORAL EVERY 6 HOURS PRN
Status: DISCONTINUED | OUTPATIENT
Start: 2022-07-28 | End: 2022-07-29 | Stop reason: HOSPADM

## 2022-07-27 RX ORDER — CITRIC ACID/SODIUM CITRATE 334-500MG
30 SOLUTION, ORAL ORAL
Status: DISCONTINUED | OUTPATIENT
Start: 2022-07-27 | End: 2022-07-27 | Stop reason: HOSPADM

## 2022-07-27 RX ORDER — CARBOPROST TROMETHAMINE 250 UG/ML
250 INJECTION, SOLUTION INTRAMUSCULAR
Status: DISCONTINUED | OUTPATIENT
Start: 2022-07-27 | End: 2022-07-29 | Stop reason: HOSPADM

## 2022-07-27 RX ORDER — ACETAMINOPHEN 325 MG/1
650 TABLET ORAL EVERY 4 HOURS PRN
Status: DISCONTINUED | OUTPATIENT
Start: 2022-07-30 | End: 2022-07-29 | Stop reason: HOSPADM

## 2022-07-27 RX ORDER — ACETAMINOPHEN 325 MG/1
975 TABLET ORAL ONCE
Status: COMPLETED | OUTPATIENT
Start: 2022-07-27 | End: 2022-07-27

## 2022-07-27 RX ORDER — ONDANSETRON 2 MG/ML
4 INJECTION INTRAMUSCULAR; INTRAVENOUS EVERY 6 HOURS PRN
Status: DISCONTINUED | OUTPATIENT
Start: 2022-07-27 | End: 2022-07-29 | Stop reason: HOSPADM

## 2022-07-27 RX ORDER — HYDROMORPHONE HCL IN WATER/PF 6 MG/30 ML
.3-.5 PATIENT CONTROLLED ANALGESIA SYRINGE INTRAVENOUS EVERY 30 MIN PRN
Status: DISCONTINUED | OUTPATIENT
Start: 2022-07-27 | End: 2022-07-29 | Stop reason: HOSPADM

## 2022-07-27 RX ORDER — HYDROMORPHONE HCL IN WATER/PF 6 MG/30 ML
0.2 PATIENT CONTROLLED ANALGESIA SYRINGE INTRAVENOUS EVERY 5 MIN PRN
Status: DISCONTINUED | OUTPATIENT
Start: 2022-07-27 | End: 2022-07-29 | Stop reason: HOSPADM

## 2022-07-27 RX ORDER — MODIFIED LANOLIN
OINTMENT (GRAM) TOPICAL
Status: DISCONTINUED | OUTPATIENT
Start: 2022-07-27 | End: 2022-07-29 | Stop reason: HOSPADM

## 2022-07-27 RX ORDER — MORPHINE SULFATE 1 MG/ML
INJECTION, SOLUTION EPIDURAL; INTRATHECAL; INTRAVENOUS
Status: COMPLETED | OUTPATIENT
Start: 2022-07-27 | End: 2022-07-27

## 2022-07-27 RX ORDER — SIMETHICONE 80 MG
80 TABLET,CHEWABLE ORAL 4 TIMES DAILY PRN
Status: DISCONTINUED | OUTPATIENT
Start: 2022-07-27 | End: 2022-07-29 | Stop reason: HOSPADM

## 2022-07-27 RX ORDER — NALOXONE HYDROCHLORIDE 0.4 MG/ML
0.2 INJECTION, SOLUTION INTRAMUSCULAR; INTRAVENOUS; SUBCUTANEOUS
Status: DISCONTINUED | OUTPATIENT
Start: 2022-07-27 | End: 2022-07-29 | Stop reason: HOSPADM

## 2022-07-27 RX ORDER — AMOXICILLIN 250 MG
1 CAPSULE ORAL 2 TIMES DAILY
Status: DISCONTINUED | OUTPATIENT
Start: 2022-07-27 | End: 2022-07-29 | Stop reason: HOSPADM

## 2022-07-27 RX ORDER — BISACODYL 10 MG
10 SUPPOSITORY, RECTAL RECTAL DAILY PRN
Status: DISCONTINUED | OUTPATIENT
Start: 2022-07-29 | End: 2022-07-29 | Stop reason: HOSPADM

## 2022-07-27 RX ORDER — ONDANSETRON 2 MG/ML
4 INJECTION INTRAMUSCULAR; INTRAVENOUS EVERY 30 MIN PRN
Status: DISCONTINUED | OUTPATIENT
Start: 2022-07-27 | End: 2022-07-29 | Stop reason: HOSPADM

## 2022-07-27 RX ORDER — ONDANSETRON 4 MG/1
4 TABLET, ORALLY DISINTEGRATING ORAL EVERY 30 MIN PRN
Status: DISCONTINUED | OUTPATIENT
Start: 2022-07-27 | End: 2022-07-29 | Stop reason: HOSPADM

## 2022-07-27 RX ADMIN — Medication 300 ML/HR: at 10:51

## 2022-07-27 RX ADMIN — OXYCODONE HYDROCHLORIDE 10 MG: 5 TABLET ORAL at 14:59

## 2022-07-27 RX ADMIN — PHENYLEPHRINE HYDROCHLORIDE 100 MCG: 10 INJECTION INTRAVENOUS at 10:27

## 2022-07-27 RX ADMIN — LABETALOL HYDROCHLORIDE 300 MG: 100 TABLET, FILM COATED ORAL at 08:01

## 2022-07-27 RX ADMIN — ACETAMINOPHEN 975 MG: 325 TABLET ORAL at 09:27

## 2022-07-27 RX ADMIN — KETOROLAC TROMETHAMINE 30 MG: 30 INJECTION, SOLUTION INTRAMUSCULAR; INTRAVENOUS at 19:09

## 2022-07-27 RX ADMIN — BUPIVACAINE 20 ML: 13.3 INJECTION, SUSPENSION, LIPOSOMAL INFILTRATION at 11:50

## 2022-07-27 RX ADMIN — OXYCODONE HYDROCHLORIDE 5 MG: 5 TABLET ORAL at 22:27

## 2022-07-27 RX ADMIN — OXYCODONE HYDROCHLORIDE 5 MG: 5 TABLET ORAL at 21:39

## 2022-07-27 RX ADMIN — BUPIVACAINE HYDROCHLORIDE 20 ML: 2.5 INJECTION, SOLUTION EPIDURAL; INFILTRATION; INTRACAUDAL at 11:50

## 2022-07-27 RX ADMIN — ONDANSETRON 4 MG: 2 INJECTION INTRAMUSCULAR; INTRAVENOUS at 10:21

## 2022-07-27 RX ADMIN — LABETALOL HYDROCHLORIDE 300 MG: 100 TABLET, FILM COATED ORAL at 20:06

## 2022-07-27 RX ADMIN — PHENYLEPHRINE HYDROCHLORIDE 0.6 MCG/KG/MIN: 10 INJECTION INTRAVENOUS at 10:22

## 2022-07-27 RX ADMIN — ACETAMINOPHEN 975 MG: 325 TABLET ORAL at 21:39

## 2022-07-27 RX ADMIN — ACETAMINOPHEN 650 MG: 325 TABLET ORAL at 03:31

## 2022-07-27 RX ADMIN — ACETAMINOPHEN 975 MG: 325 TABLET ORAL at 14:59

## 2022-07-27 RX ADMIN — KETOROLAC TROMETHAMINE 30 MG: 30 INJECTION, SOLUTION INTRAMUSCULAR at 12:54

## 2022-07-27 RX ADMIN — Medication 3 G: at 10:18

## 2022-07-27 RX ADMIN — BUPIVACAINE HYDROCHLORIDE IN DEXTROSE 1.6 ML: 7.5 INJECTION, SOLUTION SUBARACHNOID at 10:18

## 2022-07-27 RX ADMIN — Medication 0.15 MG: at 10:18

## 2022-07-27 RX ADMIN — SODIUM CHLORIDE, SODIUM LACTATE, POTASSIUM CHLORIDE, CALCIUM CHLORIDE AND DEXTROSE MONOHYDRATE 1000 ML: 5; 600; 310; 30; 20 INJECTION, SOLUTION INTRAVENOUS at 13:58

## 2022-07-27 RX ADMIN — MORPHINE SULFATE 10 MG: 10 INJECTION, SOLUTION INTRAMUSCULAR; INTRAVENOUS at 00:19

## 2022-07-27 RX ADMIN — SODIUM CHLORIDE, POTASSIUM CHLORIDE, SODIUM LACTATE AND CALCIUM CHLORIDE: 600; 310; 30; 20 INJECTION, SOLUTION INTRAVENOUS at 10:15

## 2022-07-27 RX ADMIN — SENNOSIDES AND DOCUSATE SODIUM 1 TABLET: 50; 8.6 TABLET ORAL at 20:09

## 2022-07-27 RX ADMIN — SODIUM CHLORIDE, POTASSIUM CHLORIDE, SODIUM LACTATE AND CALCIUM CHLORIDE 500 ML: 600; 310; 30; 20 INJECTION, SOLUTION INTRAVENOUS at 09:19

## 2022-07-27 RX ADMIN — PHENYLEPHRINE HYDROCHLORIDE 100 MCG: 10 INJECTION INTRAVENOUS at 10:24

## 2022-07-27 RX ADMIN — SODIUM CHLORIDE, SODIUM LACTATE, POTASSIUM CHLORIDE, CALCIUM CHLORIDE AND DEXTROSE MONOHYDRATE 500 ML: 5; 600; 310; 30; 20 INJECTION, SOLUTION INTRAVENOUS at 22:54

## 2022-07-27 ASSESSMENT — ACTIVITIES OF DAILY LIVING (ADL)
ADLS_ACUITY_SCORE: 18
ADLS_ACUITY_SCORE: 22
ADLS_ACUITY_SCORE: 18
ADLS_ACUITY_SCORE: 22

## 2022-07-27 NOTE — ANESTHESIA PROCEDURE NOTES
TAP Procedure Note    Pre-Procedure   Staff -        Anesthesiologist:  Angel Wooten MD       Performed By: anesthesiologist       Location: OR       Procedure Start/Stop Times: 7/27/2022 11:50 AM and 7/27/2022 11:55 AM       Pre-Anesthestic Checklist: patient identified, IV checked, site marked, risks and benefits discussed, informed consent, monitors and equipment checked, pre-op evaluation, at physician/surgeon's request and post-op pain management  Timeout:       Correct Patient: Yes        Correct Procedure: Yes        Correct Site: Yes        Correct Position: Yes        Correct Laterality: Yes        Site Marked: Yes  Procedure Documentation  Procedure: TAP       Laterality: bilateral       Patient Position: supine       Patient Prep/Sterile Barriers: sterile gloves, mask       Skin prep: DuraPrep and Chloraprep       Needle Type: insulated       Needle Gauge: 20.        Needle Length (Inches): 6        Ultrasound guided       1. Ultrasound was used to identify targeted nerve, plexus, vascular marker, or fascial plane and place a needle adjacent to it in real-time.       2. Ultrasound was used to visualize the spread of anesthetic in close proximity to the above referenced structure.       3. A permanent image is entered into the patient's record.       4. The visualized anatomic structures appeared normal.       5. There were no apparent abnormal pathologic findings.    Assessment/Narrative         The placement was negative for: blood aspirated, painful injection and site bleeding       Paresthesias: No.       Bolus given via needle. no blood aspirated via catheter.        Secured via.        Insertion/Infusion Method: Single Shot    Medication(s) Administered   Bupivacaine 0.25% PF (Infiltration) - Infiltration   20 mL - 7/27/2022 11:50:00 AM  Bupivacaine liposome (Exparel) 1.3% LA inj susp (Infiltration) - Infiltration   20 mL - 7/27/2022 11:50:00 AM  Medication Administration Time: 7/27/2022 11:50  AM

## 2022-07-27 NOTE — H&P
22 Kiera Iraheta  [birth year]    Chief Complaint:  36 weeks 1 day with chronic hypertension and gestational hypertension without severe features, now with a nonreactive nonstress test.    History of the Present Illness:  Patient is a 26-year-old  8 para 3 living children 5 spontaneous loss 4 (one set of triplets) with an estimated date of delivery of 22 making her now 36 weeks 1 day.  She is been undergoing fetal surveillance for chronic hypertension and pregnancy-induced hypertension.  She currently takes labetalol 300 mg twice daily.  Nonstress test in the office today was clearly nonreactive.  Biophysical profile score was 6/8 with inadequate breathing movements.  She is scheduled for repeat  section on 22 at 37 weeks 0 days however since the nonstress test is nonreactive, she is been admitted for observation and betamethasone.  She had a  section at 37 weeks in  after she was induced for high blood pressure.  In 2019, she underwent  section at 38 weeks 1 day for preeclampsia and breech presentation of triplet pregnancy.  In 2020, she underwent induction of labor with arrest of descent at 37 weeks and had a repeat .  With this pregnancy, she is opting for repeat  section.    Past Medical History:  At her first visit, she denied medical problems except chronic hypertension, pregnancy-induced hypertension and preeclampsia.  She does have anxiety and takes Celexa 20 mg daily.  She takes labetalol 300 mg twice daily.  She started with labetalol 200 mg twice daily.  She had laparoscopy as an ovarian wedge resection in the past, a nasal fracture in the past, a cardiac ablation for SVT.  She has no known drug allergies.  She was also taking progesterone 200 mg 2 times weekly IM.  She takes prenatal vitamins.  She also takes aspirin 81 mg daily.    Personal, Family and Social History:  She is been  for 6 years and does not  smoke and does not drink alcohol and pregnant.  She is a stay-at-home mom.  Her  works for the [employer] in finance.    Review of Systems:  She states she has definitely noticed decreased fetal movement.    Physical Exam:  Blood pressure 126/69, pulse 114, temp 98.0.  General appearance: Slightly obese white female with a large gravid uterus.  Lungs: Clear.  Heart: Normal sinus rhythm without murmurs.  Breasts: Not examined.  Abdomen: Gravid uterus with fundal height 42 cm.  Estimated weight 8-1/2 pounds.  Fetal heart rate tracing is category 2, nonreactive.  Pfannenstiel scars present.  Pelvic exam: Deferred today.    Assessment:  1.  Intrauterine pregnancy at 36 weeks 1 day with decreased movement and a nonreactive nonstress test with a biophysical profile score of 6/8 with inadequate breathing movements.  2.  Chronic hypertension and gestational hypertension without severe features, currently treated with labetalol 300 mg twice daily.  3.  Previous  section ×3.    Plan:  The baby's heart rate tracing right now is category 2 with a little reactivity.  Biophysical profile score done here at the hospital showed 8/8 with a single deepest pocket of 4.2 cm.  I discussed treatment options and recommended she be hospitalized on the monitor.  If the tracing continues to be category to tomorrow with no accelerations, I will likely perform a repeat  section at that time.  If she has any decelerations, she will undergo  section.  In the office, I discussed the risk of  section with her including infection, bleeding, anesthesia and injury to her the baby and prematurity.  With the steroids, her baby should be able to breathe normally.  HELLP labs have been ordered with a fluid bolus.  She will be continued on the labetalol 300 mg twice daily.    Chele Claros MD

## 2022-07-27 NOTE — PROGRESS NOTES
7-27-22    S: Feels well. Not much movement.    O: VSS  Tracing: Would not pass an NST. Occasional short variables not related to contractions.    A: Deteriorating FHR pattern.    P: Repeat C/S this am. Risks discussed including infection, bleeding, anesthesia and injury to her or baby and prematurity. Pt agreeable to proceed.    Kuldip Claros MD

## 2022-07-27 NOTE — PROGRESS NOTES
Starting 0551, episodic decreases in FHR of 5-15 bpm from baseline for 50-80 sec, not associated with ctxs. Single variable decel noted at 0632. RN encouraged PO 500ml fluid intake. Will continue to monitor EFM.

## 2022-07-27 NOTE — PLAN OF CARE
"VSS, afebrile. Pt denies visual changes and epigastric pain; endorses 4/10 headache and back pain after 2131 tyenol dose. Reports improvement after morphine injection and second dose of tylenol overnight, rating HA and back pain 2-3/10.   EFM: difficulty tracing FHR d/t high BMI, RN at bedside throughout night readjusting US. EFM reveals minimal variability following vistaril and morphine with periods of moderate variability. FHR in 140s bpm, without accels or decels. RN encouraging PO fluid intake and frequent voiding with position changes.  Contractions: Pt reports feeling \"Gary Monique\" ctxs. Ctxs nonpalpable, toco tracing occasional ctx.    Problem: Plan of Care - These are the overarching goals to be used throughout the patient stay.    Goal: Plan of Care Review/Shift Note  Outcome: Ongoing, Progressing  Flowsheets (Taken 7/27/2022 0051)  Plan of Care Reviewed With:    patient    spouse  Overall Patient Progress: improving     Problem: Hypertensive Disorders in Pregnancy  Goal: Maternal-Fetal Stabilization  Outcome: Ongoing, Progressing     "

## 2022-07-27 NOTE — ANESTHESIA CARE TRANSFER NOTE
Patient: iKera Iraheta    Procedure: Procedure(s):  REPEAT  SECTION       Diagnosis: Term pregnancy [Z34.90]  Previous  section [Z98.891]  Chronic hypertension [I10]  Diagnosis Additional Information: No value filed.    Anesthesia Type:   Spinal     Note:    Oropharynx: oropharynx clear of all foreign objects  Level of Consciousness: awake  Oxygen Supplementation: room air    Independent Airway: airway patency satisfactory and stable  Dentition: dentition unchanged    Report to RN Given: handoff report given  Patient transferred to: Labor and Delivery    Handoff Report: Identifed the Patient, Identified the Reponsible Provider, Reviewed the pertinent medical history, Discussed the surgical course, Reviewed Intra-OP anesthesia mangement and issues during anesthesia, Set expectations for post-procedure period and Allowed opportunity for questions and acknowledgement of understanding      Vitals:  Vitals Value Taken Time   /56 22 1203   Temp 98.5    Pulse 104    Resp 16    SpO2 94 % 22 1203   Vitals shown include unvalidated device data.    Electronically Signed By: ALIREZA Albarran CRNA  2022  12:07 PM

## 2022-07-27 NOTE — PROVIDER NOTIFICATION
Late entry:    On assessment, VSS, DTRs +1, 1 beat of clonus bilaterally. Pt complains of new headache 4/10. At 0053, Dr. Claros notified of headache and clonus with BP WNL. VO for one-time dose of IM morphine 10mg. Will monitor EFM continuously overnight and reassess headache following injection.

## 2022-07-27 NOTE — ANESTHESIA PROCEDURE NOTES
Intrathecal injection Procedure Note    Pre-Procedure   Staff -        Anesthesiologist:  Angel Wooten MD       Performed By: anesthesiologist       Location: OR       Procedure Start/Stop Times: 7/27/2022 10:18 AM and 7/27/2022 10:23 AM       Pre-Anesthestic Checklist: patient identified, IV checked, risks and benefits discussed, informed consent, monitors and equipment checked, pre-op evaluation, at physician/surgeon's request and post-op pain management  Timeout:       Correct Patient: Yes        Correct Procedure: Yes        Correct Site: Yes        Correct Position: Yes   Procedure Documentation  Procedure: intrathecal injection       Patient Position: sitting       Patient Prep/Sterile Barriers: sterile gloves, mask, patient draped       Skin prep: Chloraprep       Insertion Site: L3-4. (midline approach).       Needle Gauge: 25.        Needle Length (Inches): 4        Spinal Needle Type: Pencan       Introducer used       # of attempts: 1 and  # of redirects:     Assessment/Narrative         Paresthesias: No.       CSF fluid: clear.    Medication(s) Administered   0.75% Hyperbaric Bupivacaine (Intrathecal) - Intrathecal   1.6 mL - 7/27/2022 10:18:00 AM  Morphine PF 1 mg/mL (Intrathecal) - Intrathecal   0.15 mg - 7/27/2022 10:18:00 AM  Medication Administration Time: 7/27/2022 10:18 AM

## 2022-07-27 NOTE — PROGRESS NOTES
Kiera has mild H/A this morning. VS as noted. Ctx's are regular but mild. Category 2 tracing throughout morning. Dr. Claros was here to evaluate. Plan for C/S this morning. Kiera is prepped and ready to go. Awaiting lab results.

## 2022-07-27 NOTE — OP NOTE
22 Kiera Iraheta [facility] 89    Operative note    Preoperative diagnosis: 1.  Intrauterine pregnancy at 36 weeks 2 days.  2.  Previous  section x3.  3.  Abnormal heart rate tracing with no accelerations and random variable decelerations.  4.  Chronic hypertension and gestational hypertension without severe features treated with labetalol 300 mg twice daily.  5.  BMI of 53.    Postoperative diagnosis: 1.  Same.    Procedure: 2022, repeat  section.    Surgeon: Harlan.    Anesthetic agent: Spinal.    Specimens: None.    Findings: See the operative note.    Description of the operation: The patient was placed in the supine position with the table in slight left lateral tilt after spinal anesthesia was placed.  The abdomen was prepped and draped for repeat  section in routine manner with a Prtat catheter draining urinary bladder.  The skin was tested and found to be anesthetized.  A Pfannenstiel incision was made through the skin and the old scar excised.  It was carried through the subcutaneous tissue, fascia and peritoneum without difficulty.  There were no adhesions from the anterior abdominal wall to the uterus.  The bladder peritoneum was divided sharply and the bladder flap bluntly developed.  Uterus was incised with a knife and incision extended laterally by tearing with the fingers.  Membranes were ruptured which returned clear fluid.  Delivery was accomplished of a 7 pound 7 ounce female infant with Apgar scores of 8 and 9.  The cord was allowed to pulse for 1 minute prior to clamping.  The placenta delivered spontaneously.  The hysterotomy wound was closed in layers of 0 PDS, the first running in the second imbricating over the first.  The visceral peritoneum was reapproximated with a running 3-0 Vicryl suture.  The pelvis was irrigated with 500 cc of normal saline and found to be dry.  The parietal peritoneum was closed with a running 3-0 Vicryl suture everting the  rough edge into the wound to prevent adhesion formation.  The muscle was reapproximated with 3-0 Vicryl suture.  The fascia was closed with double-stranded 0 PDS suture.  The subcutaneous tissue was closed with 3 layers of 3-0 Vicryl suture and one layer of 3-0 Vicryl subcuticular on the skin.  Estimated blood loss was 400 cc.  The patient tolerated the operation well and was returned to the recovery room in good condition.  Sponge and needle counts were correct.  The Pratt catheter was draining clear urine.    Chele Claros MD

## 2022-07-27 NOTE — ANESTHESIA PREPROCEDURE EVALUATION
Anesthesia Pre-Procedure Evaluation    Patient: Kiera Iraheta   MRN: [id number] : 1995        Procedure : Procedure(s):  REPEAT  SECTION          Past Medical History:   Diagnosis Date     Anxiety      Anxiety disorder      Depression      Hard to intubate      Hypertension     history of chronic and gestational hypertension     PCOS (polycystic ovarian syndrome)      Prediabetes      Severe dysmenorrhea       Past Surgical History:   Procedure Laterality Date     C/SECTION, LOW TRANSVERSE      fetal intolerance after long induction     CERCLAGE CERVICAL N/A 2018    Procedure: BALDERAS CERCLAGE;  Surgeon: Chele Claros MD;  [place] [facility] [place]:       SECTION N/A 2017    Procedure:  SECTION;  Surgeon: Chele Claros MD;  [facility];  Service:       SECTION N/A 2019    Procedure:  SECTION, REPEAT;  Surgeon: Chele Claros MD;  Location: [facility];  Service: Obstetrics      SECTION N/A 2020    Procedure:  SECTION;  Surgeon: Chele Claros MD;  Location: [facility];  Service: Obstetrics     LAPAROSCOPY DIAGNOSTIC (GENERAL) N/A 2016    Procedure: LAPAROSCOPY WITH TUBAL DYE STUDY LAPAROTOMY RIGHT OVARIAN BIOPSY,LEFT OVARIAN BIOPSY FULGERATION OF ENDOMETRIOSOS;  Surgeon: Chele Claros MD;  [place] [facility] [place]  Service:      NOSE SURGERY       OTHER SURGICAL HISTORY      wisdom teeth extractions      No Known Allergies   Social History     Tobacco Use     Smoking status: Never Smoker     Smokeless tobacco: Never Used   Substance Use Topics     Alcohol use: No     Comment: Alcoholic Drinks/day: rarely      Wt Readings from Last 1 Encounters:   22 (!) 153.8 kg (339 lb)        Anesthesia Evaluation            ROS/MED HX  ENT/Pulmonary:       Neurologic:       Cardiovascular:     (+) hypertension-----     METS/Exercise Tolerance:     Hematologic:       Musculoskeletal:       GI/Hepatic:       Renal/Genitourinary:       Endo:     (+) Obesity,     Psychiatric/Substance Use:       Infectious Disease:       Malignancy:       Other:            Physical Exam    Airway        Mallampati: II    Neck ROM: full     Respiratory Devices and Support         Dental           Cardiovascular   cardiovascular exam normal          Pulmonary   pulmonary exam normal                OUTSIDE LABS:  CBC:   Lab Results   Component Value Date    WBC 13.7 (H) 07/26/2022    WBC 10.2 12/10/2020    HGB 11.4 (L) 07/26/2022    HGB 9.8 (L) 12/10/2020    HCT 34.8 (L) 07/26/2022    HCT 31.0 (L) 12/10/2020     07/27/2022     07/26/2022     BMP:   Lab Results   Component Value Date     07/26/2022    POTASSIUM 3.9 07/26/2022    CHLORIDE 105 07/26/2022    CO2 21 (L) 07/26/2022    BUN 7 (L) 07/26/2022    CR 0.63 07/26/2022    CR 0.68 12/10/2020     07/26/2022     COAGS:   Lab Results   Component Value Date    PTT 24 07/27/2022    INR 0.99 07/27/2022     POC: No results found for: BGM, HCG, HCGS  HEPATIC:   Lab Results   Component Value Date    ALT 13 07/26/2022    AST 15 07/26/2022     OTHER:   Lab Results   Component Value Date    ROSY 9.4 07/26/2022       Anesthesia Plan    ASA Status:  3      Anesthesia Type: Spinal.              Consents    Anesthesia Plan(s) and associated risks, benefits, and realistic alternatives discussed. Questions answered and patient/representative(s) expressed understanding.    - Discussed:     - Discussed with:  Patient         Postoperative Care    Pain management: intrathecal morphine, Peripheral nerve block (Single Shot).        Comments:                Angel Wooten MD

## 2022-07-27 NOTE — PROGRESS NOTES
Pt's vs and assessment stable. Pt denies headache, visual changes and epigastric pain.  EFM: extremely difficult trace due to body maternal habitus. Rn at bedside continuously readjusting.   EFM: moderate variability with accelerations, periods of minimal variability/, one questionable deceleration at 2018. RN was not  bedside side at this time, left to get pt's medications. Pt's pulse 110  when RN returned to the room and readjusted EFM: FHR back to baseline.   Contractions: pt denies feeling contractions, none-palpated, toco tracing occasional ctx.     Dr. Claros updated regarding fetal tracing, contractions,  blood pressure, lab results and pt's request for sleep medication.   Order receive one time 200mg of Atarax.

## 2022-07-28 LAB — HGB BLD-MCNC: 10.5 G/DL (ref 11.7–15.7)

## 2022-07-28 PROCEDURE — 250N000011 HC RX IP 250 OP 636: Performed by: OBSTETRICS & GYNECOLOGY

## 2022-07-28 PROCEDURE — 250N000013 HC RX MED GY IP 250 OP 250 PS 637: Performed by: OBSTETRICS & GYNECOLOGY

## 2022-07-28 PROCEDURE — 36415 COLL VENOUS BLD VENIPUNCTURE: CPT | Performed by: OBSTETRICS & GYNECOLOGY

## 2022-07-28 PROCEDURE — 85018 HEMOGLOBIN: CPT | Performed by: OBSTETRICS & GYNECOLOGY

## 2022-07-28 PROCEDURE — 120N000001 HC R&B MED SURG/OB

## 2022-07-28 RX ADMIN — SENNOSIDES AND DOCUSATE SODIUM 1 TABLET: 50; 8.6 TABLET ORAL at 08:11

## 2022-07-28 RX ADMIN — ACETAMINOPHEN 975 MG: 325 TABLET ORAL at 15:25

## 2022-07-28 RX ADMIN — SENNOSIDES AND DOCUSATE SODIUM 1 TABLET: 50; 8.6 TABLET ORAL at 20:53

## 2022-07-28 RX ADMIN — KETOROLAC TROMETHAMINE 30 MG: 30 INJECTION, SOLUTION INTRAMUSCULAR; INTRAVENOUS at 06:36

## 2022-07-28 RX ADMIN — LABETALOL HYDROCHLORIDE 300 MG: 100 TABLET, FILM COATED ORAL at 08:09

## 2022-07-28 RX ADMIN — ACETAMINOPHEN 975 MG: 325 TABLET ORAL at 09:33

## 2022-07-28 RX ADMIN — OXYCODONE HYDROCHLORIDE 10 MG: 5 TABLET ORAL at 19:03

## 2022-07-28 RX ADMIN — IBUPROFEN 800 MG: 800 TABLET ORAL at 19:04

## 2022-07-28 RX ADMIN — KETOROLAC TROMETHAMINE 30 MG: 30 INJECTION, SOLUTION INTRAMUSCULAR; INTRAVENOUS at 00:41

## 2022-07-28 RX ADMIN — OXYCODONE HYDROCHLORIDE 10 MG: 5 TABLET ORAL at 06:37

## 2022-07-28 RX ADMIN — ACETAMINOPHEN 975 MG: 325 TABLET ORAL at 20:54

## 2022-07-28 RX ADMIN — OXYCODONE HYDROCHLORIDE 10 MG: 5 TABLET ORAL at 22:56

## 2022-07-28 RX ADMIN — KETOROLAC TROMETHAMINE 30 MG: 30 INJECTION, SOLUTION INTRAMUSCULAR; INTRAVENOUS at 12:39

## 2022-07-28 RX ADMIN — OXYCODONE HYDROCHLORIDE 10 MG: 5 TABLET ORAL at 02:55

## 2022-07-28 RX ADMIN — OXYCODONE HYDROCHLORIDE 10 MG: 5 TABLET ORAL at 15:25

## 2022-07-28 RX ADMIN — OXYCODONE HYDROCHLORIDE 5 MG: 5 TABLET ORAL at 10:39

## 2022-07-28 RX ADMIN — ACETAMINOPHEN 975 MG: 325 TABLET ORAL at 03:35

## 2022-07-28 RX ADMIN — LABETALOL HYDROCHLORIDE 300 MG: 100 TABLET, FILM COATED ORAL at 20:53

## 2022-07-28 ASSESSMENT — ACTIVITIES OF DAILY LIVING (ADL)
ADLS_ACUITY_SCORE: 18
ADLS_ACUITY_SCORE: 22
ADLS_ACUITY_SCORE: 18

## 2022-07-28 NOTE — ANESTHESIA POSTPROCEDURE EVALUATION
Patient: Kiera Iraheta    Procedure: Procedure(s):  REPEAT  SECTION       Anesthesia Type:  Spinal    Note:  Disposition: Inpatient   Postop Pain Control: Uneventful            Sign Out: Well controlled pain   PONV: No   Neuro/Psych: Uneventful            Sign Out: Acceptable/Baseline neuro status   Airway/Respiratory: Uneventful            Sign Out: Acceptable/Baseline resp. status   CV/Hemodynamics: Uneventful            Sign Out: Acceptable CV status; No obvious hypovolemia; No obvious fluid overload   Other NRE: NONE   DID A NON-ROUTINE EVENT OCCUR? No           Last vitals:  Vitals:    22 0150 22 0350 22 0809   BP:  120/64 135/75   Pulse:  91 90   Resp: 14 16 16   Temp:  36.7  C (98  F) 36.9  C (98.4  F)   SpO2:   98%       Electronically Signed By: Angel Wooten MD  2022  10:50 AM

## 2022-07-28 NOTE — PLAN OF CARE
Patient is managing pain with Oxycodone, Ibuprofen and Tylenol.   Postpartum assessment WNL. BP WNL. Denies any preeclamptic symptoms. Reflexes diminished.   Still needs to complete mood assessment and birth certificate.     Problem: Hypertensive Disorders in Pregnancy  Goal: Maternal-Fetal Stabilization  Outcome: Ongoing, Progressing     Problem: Bleeding (Postpartum  Delivery)  Goal: Hemostasis  Outcome: Ongoing, Progressing     Problem: Infection (Postpartum  Delivery)  Goal: Absence of Infection Signs and Symptoms  Outcome: Ongoing, Progressing     Problem: Pain (Postpartum  Delivery)  Goal: Acceptable Pain Control  Outcome: Ongoing, Progressing     Problem: Postoperative Nausea and Vomiting (Postpartum  Delivery)  Goal: Nausea and Vomiting Relief  Outcome: Ongoing, Progressing     Problem: Postoperative Urinary Retention (Postpartum  Delivery)  Goal: Effective Urinary Elimination  Outcome: Ongoing, Progressing  Intervention: Monitor and Manage Urinary Retention  Recent Flowsheet Documentation  Taken 2022 0350 by Shira Noyola, RN  Urinary Elimination Promotion: catheter patency maintained  Taken 2022 2350 by Shira Noyola, RN  Urinary Elimination Promotion: catheter patency maintained

## 2022-07-28 NOTE — PLAN OF CARE
Patient progressing as expected for postpartum day one.,  delivery. Patient independent with cares, pain well controlled with oxy, tylenol and toradol. Patient is breast and formula feeding baby. Fundus firm at U/U. Pt is eating, drinking and ambulating well. Continuing to monitor.

## 2022-07-28 NOTE — PLAN OF CARE
Problem: Plan of Care - These are the overarching goals to be used throughout the patient stay.    Goal: Optimal Comfort and Wellbeing  Intervention: Provide Person-Centered Care  Recent Flowsheet Documentation  Taken 2022 by Zully Garay RN  Trust Relationship/Rapport:   empathic listening provided   emotional support provided   questions answered   questions encouraged   choices provided   reassurance provided   thoughts/feelings acknowledged   care explained     Problem: Adjustment to Role Transition (Postpartum  Delivery)  Goal: Successful Maternal Role Transition  Intervention: Support Maternal Role Transition  Recent Flowsheet Documentation  Taken 2022 by Zully Garay RN  Supportive Measures:   active listening utilized   decision-making supported   goal-setting facilitated   positive reinforcement provided   relaxation techniques promoted   verbalization of feelings encouraged   VSSForrest Qureshi complains of minimal pain and is taking scheduled and prn meds but complains of fatigue and lack of sleep. She is encouraged to rest and RN offered help for feeding her baby. She is independent with cares and feeding. OB checks were normal and she was able to toilet and shower independently. To monitor.

## 2022-07-28 NOTE — PROGRESS NOTES
7-28-22    S: Feels well. Not too much pain.    O: VSS Afebrile  Abd: Mildly tender. Dressing is dry.  H.4 preop. 10.5 this am.    A: POD #1, doing well.    P: Supportive care. Pain control. Ambulate.    Kuldip Claros MD

## 2022-07-28 NOTE — PROVIDER NOTIFICATION
07/27/22 2248   Provider Notification   Provider Name/Title Dr Claros   Method of Notification Phone   Request Evaluate-Remote   Notification Reason Status Update    Updated Provider Re: low urine output and pink tinged urine. Orders to give pt 500mL bolus of D5 LR and reassess.

## 2022-07-29 VITALS
DIASTOLIC BLOOD PRESSURE: 70 MMHG | OXYGEN SATURATION: 98 % | SYSTOLIC BLOOD PRESSURE: 129 MMHG | TEMPERATURE: 98 F | RESPIRATION RATE: 18 BRPM | BODY MASS INDEX: 45.99 KG/M2 | WEIGHT: 293 LBS | HEIGHT: 67 IN | HEART RATE: 90 BPM

## 2022-07-29 PROCEDURE — 250N000013 HC RX MED GY IP 250 OP 250 PS 637: Performed by: OBSTETRICS & GYNECOLOGY

## 2022-07-29 RX ORDER — OXYCODONE HYDROCHLORIDE 5 MG/1
5 TABLET ORAL EVERY 4 HOURS PRN
Qty: 20 TABLET | Refills: 0 | Status: ON HOLD | OUTPATIENT
Start: 2022-07-29 | End: 2023-09-05

## 2022-07-29 RX ADMIN — OXYCODONE HYDROCHLORIDE 10 MG: 5 TABLET ORAL at 03:05

## 2022-07-29 RX ADMIN — OXYCODONE HYDROCHLORIDE 10 MG: 5 TABLET ORAL at 15:15

## 2022-07-29 RX ADMIN — OXYCODONE HYDROCHLORIDE 10 MG: 5 TABLET ORAL at 06:48

## 2022-07-29 RX ADMIN — ACETAMINOPHEN 975 MG: 325 TABLET ORAL at 09:14

## 2022-07-29 RX ADMIN — IBUPROFEN 800 MG: 800 TABLET ORAL at 13:34

## 2022-07-29 RX ADMIN — ACETAMINOPHEN 975 MG: 325 TABLET ORAL at 03:05

## 2022-07-29 RX ADMIN — IBUPROFEN 800 MG: 800 TABLET ORAL at 01:12

## 2022-07-29 RX ADMIN — OXYCODONE HYDROCHLORIDE 10 MG: 5 TABLET ORAL at 10:46

## 2022-07-29 RX ADMIN — IBUPROFEN 800 MG: 800 TABLET ORAL at 06:48

## 2022-07-29 RX ADMIN — ACETAMINOPHEN 975 MG: 325 TABLET ORAL at 15:15

## 2022-07-29 RX ADMIN — SENNOSIDES AND DOCUSATE SODIUM 1 TABLET: 50; 8.6 TABLET ORAL at 09:15

## 2022-07-29 RX ADMIN — LABETALOL HYDROCHLORIDE 300 MG: 100 TABLET, FILM COATED ORAL at 09:15

## 2022-07-29 ASSESSMENT — ACTIVITIES OF DAILY LIVING (ADL)
ADLS_ACUITY_SCORE: 18

## 2022-07-29 NOTE — PLAN OF CARE
Patient progressing as expected for postpartum day two.,  delivery. Patient independent with cares, pain well controlled with oxycodone 10 mg, ibuprofen and tylenol. Patient is breastfeeding and pumping for baby. Fundus firm at U/U. Continuing to monitor.

## 2022-07-29 NOTE — PROGRESS NOTES
7/29/2022    Subjective: Patient states the pain is worse today.  She was planning to go home but now she is not sure.  She might decide later tonight.  She is able to eat and walk.  She does not get lightheaded or dizzy.    Objective: Vital signs are stable and she is afebrile.  Blood pressure is 125/60.  Temp is 98.2.  Patient is nursing so no exam done today.  Hemoglobin was 11.4 preop.  It was 10.5 yesterday morning.    Assessment: 1.  Postoperative day #2, doing well.  Considering discharge.  2.  Chronic hypertension and gestational hypertension with normal blood pressures now.    Plan: Dr. Maria will evaluate her later today.  Ambulate, pain control, supportive care.    Chele Claros MD

## 2022-07-29 NOTE — PLAN OF CARE
"  Problem: Plan of Care - These are the overarching goals to be used throughout the patient stay.    Goal: Plan of Care Review/Shift Note  Description: The Plan of Care Review/Shift note should be completed every shift.  The Outcome Evaluation is a brief statement about your assessment that the patient is improving, declining, or no change.  This information will be displayed automatically on your shift note.  Outcome: Adequate for Care Transition  Flowsheets (Taken 7/29/2022 1741)  Plan of Care Reviewed With:    patient    spouse  Overall Patient Progress: improving  Goal: Patient-Specific Goal (Individualized)  Description: You can add care plan individualizations to a care plan. Examples of Individualization might be:  \"Parent requests to be called daily at 9am for status\", \"I have a hard time hearing out of my right ear\", or \"Do not touch me to wake me up as it startles me\".  Outcome: Adequate for Care Transition  Goal: Absence of Hospital-Acquired Illness or Injury  Outcome: Adequate for Care Transition  Intervention: Prevent and Manage VTE (Venous Thromboembolism) Risk  Recent Flowsheet Documentation  Taken 7/29/2022 1600 by Yeni Gray RN  Activity Management:    activity encouraged    up ad ethel  Goal: Optimal Comfort and Wellbeing  Outcome: Adequate for Care Transition  Intervention: Monitor Pain and Promote Comfort  Recent Flowsheet Documentation  Taken 7/29/2022 1600 by Yeni Gray RN  Pain Management Interventions: emotional support  Intervention: Provide Person-Centered Care  Recent Flowsheet Documentation  Taken 7/29/2022 1600 by Yeni Gray RN  Trust Relationship/Rapport:    care explained    choices provided    empathic listening provided    questions answered    questions encouraged    reassurance provided    thoughts/feelings acknowledged  Goal: Readiness for Transition of Care  Outcome: Adequate for Care Transition     Problem: Hypertensive Disorders in " Pregnancy  Goal: Maternal-Fetal Stabilization  Outcome: Adequate for Care Transition     Problem: Adjustment to Role Transition (Postpartum  Delivery)  Goal: Successful Maternal Role Transition  Outcome: Adequate for Care Transition     Problem: Bleeding (Postpartum  Delivery)  Goal: Hemostasis  Outcome: Adequate for Care Transition     Problem: Infection (Postpartum  Delivery)  Goal: Absence of Infection Signs and Symptoms  Outcome: Adequate for Care Transition     Problem: Pain (Postpartum  Delivery)  Goal: Acceptable Pain Control  Outcome: Adequate for Care Transition  Intervention: Prevent or Manage Pain  Recent Flowsheet Documentation  Taken 2022 1600 by Yeni Gray RN  Pain Management Interventions: emotional support     Problem: Postoperative Nausea and Vomiting (Postpartum  Delivery)  Goal: Nausea and Vomiting Relief  Outcome: Adequate for Care Transition     Problem: Postoperative Urinary Retention (Postpartum  Delivery)  Goal: Effective Urinary Elimination  Outcome: Adequate for Care Transition     VSS, endorses some incisional pain which she rates 5/10 and is comfortable with the pain regimen going home with Tylenol, Ibuprofen and PRN oxycodone for which she has a paper prescription from Dr. Rahman.     RN sold pt a breast pump for at home use.     Discharge instructions were printed and reviewed with both patient and spouse. All questions and concerns were addressed. Personal belongings were returned and pt was escorted to waiting vehicle by RN.

## 2022-07-29 NOTE — PLAN OF CARE
Patient is managing pain with Oxycodone, Ibuprofen and Tylenol.   Postpartum assessment WNL.   Patient is hopeful for discharge to home today.  Tdap up to date.   Still needs to complete mood assessment and birth certificate.     Problem: Hypertensive Disorders in Pregnancy  Goal: Maternal-Fetal Stabilization  Outcome: Ongoing, Progressing     Problem: Bleeding (Postpartum  Delivery)  Goal: Hemostasis  Outcome: Ongoing, Progressing     Problem: Infection (Postpartum  Delivery)  Goal: Absence of Infection Signs and Symptoms  Outcome: Ongoing, Progressing     Problem: Pain (Postpartum  Delivery)  Goal: Acceptable Pain Control  Outcome: Ongoing, Progressing

## 2022-07-29 NOTE — LACTATION NOTE
This note was copied from a baby's chart.  This writer met with Kiera to discuss breastfeeding her LPI.  She states she is letting infant breast feed for a short time (5-10  Minutes) then offering a supplement as infant cues and pumping her breasts.  She will continue to do this until infant is gaining weight well and able to transfer well at the breast.  She needs a standard breast pump for home use and RN will sell her a pump prior to discharge.  She was encouraged to follow up at the outpatient lactation clinic prnForrest Qureshi verbalizes understanding of all education given.  She denies any further questions.

## 2022-08-15 NOTE — DISCHARGE SUMMARY
22 Kiera Iraheta [facility] [birth year]    Discharge summary:    Date admitted: 22    Date discharge: 22.    Admitting diagnosis:1.  Intrauterine pregnancy at 36 weeks 1 day with decreased movement and a nonreactive nonstress test.  2.  Chronic hypertension and gestational hypertension without severe features currently treated with labetalol 300milligrams twice daily.  3.  Previous  section 3.    Discharge diagnoses: 1.  Same.  2.  Acute blood loss anemia.    Procedures: 22, repeat  section.    Disposition: Patient is discharged home in good condition.    Hospital course: Patient is a 26-year-old  8 para 3 living children 5 (one set of triplets) spontaneous loss 4 woman with chronic hypertension and ever-increasing need for antihypertensives during her pregnancy.  She is currently on labetalol 300 mg twice daily and had a non-stress test in the office which was nonreactive clearly.  She had a nonreactive nonstress test 2 days earlier as well.  Biophysical profile score was 6/8.  She was brought into labor and delivery and the fetal heart rate tracing was category 2, nonreactive.  Because of that, she was taken to the operating room at 36 weeks 1 day for repeat  section.  She did receive steroids prior to the birth.  The  was performed without complications and she gave birth to a7 lbs. 7 oz. Female infant with Apgar scores of 8 and 9.  Her postoperative course was uncomplicated and she was discharged on her second postoperative day, 22, tolerating a general diet, ambulating without difficulty in getting along on oral pain medicine.  She was discharged with instructions to return to the office in one week, 2 weeks and 6 weeks for postoperative and postpartum visits especially since her blood pressures had been elevated.her preoperative hemoglobin was 11.4 and her day 1 hemoglobin was 10.5, making the diagnosis of acute surgical and postpartum blood loss  anemia.  Her blood type was B+ and she was immune to rubella.  Her wound was healing well at the time of discharge.  Her blood pressures were normal and the hospital so she will monitor her blood pressures at home and call if they start to elevate so that she can be restarted on her labetalol.    Chele Claros MD

## 2022-09-18 ENCOUNTER — HEALTH MAINTENANCE LETTER (OUTPATIENT)
Age: 27
End: 2022-09-18

## 2023-05-10 LAB
HEPATITIS B SURFACE ANTIGEN (EXTERNAL): NEGATIVE
HEPATITIS C ANTIBODY (EXTERNAL): NONREACTIVE
HIV1+2 AB SERPL QL IA: NONREACTIVE
RUBELLA ANTIBODY IGG (EXTERNAL): POSITIVE
VDRL (SYPHILIS) (EXTERNAL): NEGATIVE

## 2023-07-28 ENCOUNTER — HOSPITAL ENCOUNTER (OUTPATIENT)
Facility: HOSPITAL | Age: 28
Discharge: HOME | End: 2023-07-28
Attending: INTERNAL MEDICINE | Admitting: OBSTETRICS & GYNECOLOGY
Payer: COMMERCIAL

## 2023-07-28 ENCOUNTER — HOSPITAL ENCOUNTER (OUTPATIENT)
Facility: HOSPITAL | Age: 28
End: 2023-07-28
Admitting: OBSTETRICS & GYNECOLOGY
Payer: COMMERCIAL

## 2023-07-28 VITALS — SYSTOLIC BLOOD PRESSURE: 164 MMHG | DIASTOLIC BLOOD PRESSURE: 94 MMHG

## 2023-07-28 DIAGNOSIS — O13.9 GESTATIONAL HYPERTENSION AFFECTING EIGHTH PREGNANCY: ICD-10-CM

## 2023-07-28 DIAGNOSIS — O09.40 GESTATIONAL HYPERTENSION AFFECTING EIGHTH PREGNANCY: ICD-10-CM

## 2023-07-28 PROBLEM — Z36.89 ENCOUNTER FOR TRIAGE IN PREGNANT PATIENT: Status: ACTIVE | Noted: 2023-07-28

## 2023-07-28 LAB
ALBUMIN MFR UR ELPH: 14.9 MG/DL
ALBUMIN SERPL BCG-MCNC: 3.6 G/DL (ref 3.5–5.2)
ALP SERPL-CCNC: 187 U/L (ref 35–104)
ALT SERPL W P-5'-P-CCNC: 17 U/L (ref 0–50)
ANION GAP SERPL CALCULATED.3IONS-SCNC: 11 MMOL/L (ref 7–15)
AST SERPL W P-5'-P-CCNC: 25 U/L (ref 0–45)
BILIRUB SERPL-MCNC: 0.3 MG/DL
BUN SERPL-MCNC: 7 MG/DL (ref 6–20)
CALCIUM SERPL-MCNC: 9.6 MG/DL (ref 8.6–10)
CHLORIDE SERPL-SCNC: 102 MMOL/L (ref 98–107)
CREAT SERPL-MCNC: 0.47 MG/DL (ref 0.51–0.95)
CREAT UR-MCNC: 125.6 MG/DL
DEPRECATED HCO3 PLAS-SCNC: 22 MMOL/L (ref 22–29)
ERYTHROCYTE [DISTWIDTH] IN BLOOD BY AUTOMATED COUNT: 13.2 % (ref 10–15)
GFR SERPL CREATININE-BSD FRML MDRD: >90 ML/MIN/1.73M2
GLUCOSE SERPL-MCNC: 115 MG/DL (ref 70–99)
HCT VFR BLD AUTO: 35.2 % (ref 35–47)
HGB BLD-MCNC: 12.3 G/DL (ref 11.7–15.7)
HOLD SPECIMEN: NORMAL
HOLD SPECIMEN: NORMAL
MCH RBC QN AUTO: 30.2 PG (ref 26.5–33)
MCHC RBC AUTO-ENTMCNC: 34.9 G/DL (ref 31.5–36.5)
MCV RBC AUTO: 87 FL (ref 78–100)
PLATELET # BLD AUTO: 327 10E3/UL (ref 150–450)
POTASSIUM SERPL-SCNC: 3.4 MMOL/L (ref 3.4–5.3)
PROT SERPL-MCNC: 7.1 G/DL (ref 6.4–8.3)
PROT/CREAT 24H UR: 0.12 MG/MG CR (ref 0–0.2)
RBC # BLD AUTO: 4.07 10E6/UL (ref 3.8–5.2)
SODIUM SERPL-SCNC: 135 MMOL/L (ref 136–145)
WBC # BLD AUTO: 10.4 10E3/UL (ref 4–11)

## 2023-07-28 PROCEDURE — 84156 ASSAY OF PROTEIN URINE: CPT | Performed by: OBSTETRICS & GYNECOLOGY

## 2023-07-28 PROCEDURE — G0463 HOSPITAL OUTPT CLINIC VISIT: HCPCS

## 2023-07-28 PROCEDURE — 80053 COMPREHEN METABOLIC PANEL: CPT | Performed by: OBSTETRICS & GYNECOLOGY

## 2023-07-28 PROCEDURE — 36415 COLL VENOUS BLD VENIPUNCTURE: CPT | Performed by: OBSTETRICS & GYNECOLOGY

## 2023-07-28 PROCEDURE — 250N000013 HC RX MED GY IP 250 OP 250 PS 637: Performed by: OBSTETRICS & GYNECOLOGY

## 2023-07-28 PROCEDURE — 85027 COMPLETE CBC AUTOMATED: CPT | Performed by: OBSTETRICS & GYNECOLOGY

## 2023-07-28 RX ORDER — LABETALOL 200 MG/1
200 TABLET, FILM COATED ORAL 2 TIMES DAILY
Qty: 60 TABLET | Refills: 0 | Status: ON HOLD | OUTPATIENT
Start: 2023-07-28 | End: 2023-10-26

## 2023-07-28 RX ORDER — LABETALOL 200 MG/1
200 TABLET, FILM COATED ORAL EVERY 12 HOURS SCHEDULED
Status: DISCONTINUED | OUTPATIENT
Start: 2023-07-28 | End: 2023-07-29 | Stop reason: HOSPADM

## 2023-07-28 RX ADMIN — LABETALOL HYDROCHLORIDE 200 MG: 200 TABLET ORAL at 21:44

## 2023-07-28 ASSESSMENT — ACTIVITIES OF DAILY LIVING (ADL): ADLS_ACUITY_SCORE: 35

## 2023-07-29 NOTE — PROGRESS NOTES
Kiera Iraheta presented self to [facility] with c/o elevated Bps when measuring at home.  EFM applied.  FHT auscultated; fetus very active, difficult to monitor  due to activity and high maternal BMI.  Contraction pt denies feeling.    BP (!) 143/84   LMP 11/07/2021  .       Dr. Burns notified of above, orders received.     Xochitl Whatley RN

## 2023-07-29 NOTE — DISCHARGE INSTRUCTIONS
High Blood Pressure in Pregnancy: Care Instructions  Overview     High blood pressure (hypertension) means that the force of blood against your artery walls is too strong.  High blood pressure problems during pregnancy include:  Chronic hypertension. This is high blood pressure that starts before pregnancy.  Gestational hypertension. This is high blood pressure that starts in the second or third trimester of pregnancy.  Preeclampsia. This is a problem that includes high blood pressure and signs of organ injury during pregnancy. In some cases, it leads to eclampsia. Eclampsia causes seizures.  High blood pressure during pregnancy can affect the amount of oxygen and nutrients your baby receives. This can affect how your baby grows. High blood pressure can also cause other serious problems for both you and your baby. For example, the placenta might separate too early from the wall of the uterus (placental abruption). This can cause serious bleeding or premature birth.  To prevent problems, you and your baby will be watched very closely. You will have to check your blood pressure often during and after the pregnancy.  If your blood pressure rises suddenly or is very high during pregnancy, your doctor may prescribe medicines. They can usually control blood pressure.  If your blood pressure affects your or your baby's health, you may need to be monitored in the hospital. You may get medicines. Or your doctor may need to deliver your baby early.  After your baby is born, your blood pressure will probably improve. But sometimes blood pressure problems continue after birth. If you had high blood pressure during pregnancy, you have more risk of having high blood pressure, heart disease, stroke, kidney disease, and diabetes later in life. Work with your doctor to make heart-healthy lifestyle choices. These include eating healthy foods, being active, staying at a healthy weight, and not smoking. Get the checkups you need. Your  doctor may also want you to check your blood pressure at home.  Follow-up care is a key part of your treatment and safety. Be sure to make and go to all appointments, and call your doctor if you are having problems. It's also a good idea to know your test results and keep a list of the medicines you take.  How can you care for yourself at home?  Take and write down your blood pressure at home if your doctor says to.  Take your medicines exactly as prescribed. Call your doctor if you think you are having a problem with your medicine.  Monitor yourself for symptoms of preeclampsia. Call your doctor if you have symptoms such as a severe headache, vision changes, or sudden swelling in your face and hands.  If you smoke, quit or cut back as much as you can. Smoking and vaping can be harmful to your baby. Talk to your doctor if you need help quitting.  Talk with your doctor about how much weight gain is healthy for you. Gaining too much weight while you're pregnant may be harmful.  Eat a variety of healthy foods. Include plenty of foods high in calcium, such as dairy products, almonds, and dark leafy greens.  If your doctor says it's okay, get regular exercise. Doing things like walking or swimming several times a week can be healthy for you and your baby.  Try to reduce stress and find time to relax. This is very important if you continue to work or have a busy schedule. It's also important if you have small children at home.  If your doctor recommends it, keep track of your baby's movement. A common method is to note the length of time it takes to count 10 movements (such as kicks, flutters, or rolls). Call your doctor if you don't feel at least 10 movements in a 2-hour period. Track your baby's movements once each day. Bring this record with you to each prenatal visit.  When should you call for help?  Share this information with your partner or a friend. They can help you watch for warning signs.  Call 911  anytime you  "think you may need emergency care. For example, call if:    You passed out (lost consciousness).     You have a seizure.     You have trouble breathing.     You have chest pain.   Call your doctor now or seek immediate medical care if:    You have symptoms of preeclampsia, such as:  Sudden swelling of your face, hands, or feet.  New vision problems (such as dimness, blurring, or seeing spots).  A severe headache.     Your blood pressure is very high, such as 160/110 or higher.     Your blood pressure is higher than your doctor told you it should be, or it rises quickly.     You have any vaginal bleeding.     You have new nausea or vomiting.     You think that you are in labor.     You have pain in your belly or pelvis.     You gain weight rapidly.   Where can you learn more?  Go to https://www.[employer].net/patiented  Enter A052 in the search box to learn more about \"High Blood Pressure in Pregnancy: Care Instructions.\"  Current as of: November 9, 2022               Content Version: 13.7    3673-4275 [employer].   Care instructions adapted under license by your healthcare professional. If you have questions about a medical condition or this instruction, always ask your healthcare professional. [employer] disclaims any warranty or liability for your use of this information.    Most Recent 3 CBC's:  Recent Labs   Lab Test 07/28/23 2024 07/28/22 0747 07/27/22 0917 07/26/22  1939 12/10/20  0621   WBC 10.4  --   --  13.7* 10.2   HGB 12.3 10.5*  --  11.4* 9.8*   MCV 87  --   --  85 89     --  408 355 295      Most Recent 3 BMP's:  Recent Labs   Lab Test 07/28/23  2024 07/26/22  1939 12/10/20  0621   * 137  --    POTASSIUM 3.4 3.9  --    CHLORIDE 102 105  --    CO2 22 21*  --    BUN 7.0 7*  --    CR 0.47* 0.63 0.68   ANIONGAP 11 11  --    ROSY 9.6 9.4  --    * 111  --      Most Recent 2 LFT's:  Recent Labs   Lab Test 07/28/23 2024 07/26/22 1939   AST 25 15   ALT 17 13 "   ALKPHOS 187*  --    BILITOTAL 0.3  --      Most Recent INR's and Anticoagulation Dosing History:  Anticoagulation Dose History  More data exists         Latest Ref Rng & Units 2/7/2019 2/8/2019 2/9/2019 12/7/2020   Recent Dosing and Labs   INR 0.85 - 1.15 0.95  0.98  0.93  0.91          12/10/2020 7/26/2022 7/27/2022   Recent Dosing and Labs   INR 0.95  1.01  0.99

## 2023-07-30 ENCOUNTER — HEALTH MAINTENANCE LETTER (OUTPATIENT)
Age: 28
End: 2023-07-30

## 2023-08-02 NOTE — L&D DELIVERY NOTE
22 Kiera Iraheta [facility] 89    Operative note    Preoperative diagnosis: 1.  Intrauterine pregnancy at 36 weeks 2 days.  2.  Previous  section x3.  3.  Abnormal heart rate tracing with no accelerations and random variable decelerations.  4.  Chronic hypertension and gestational hypertension without severe features treated with labetalol 300 mg twice daily.  5.  BMI of 53.    Postoperative diagnosis: 1.  Same.    Procedure: 2022, repeat  section.    Surgeon: Harlan.    Anesthetic agent: Spinal.    Specimens: None.    Findings: See the operative note.    Description of the operation: The patient was placed in the supine position with the table in slight left lateral tilt after spinal anesthesia was placed.  The abdomen was prepped and draped for repeat  section in routine manner with a Pratt catheter draining urinary bladder.  The skin was tested and found to be anesthetized.  A Pfannenstiel incision was made through the skin and the old scar excised.  It was carried through the subcutaneous tissue, fascia and peritoneum without difficulty.  There were no adhesions from the anterior abdominal wall to the uterus.  The bladder peritoneum was divided sharply and the bladder flap bluntly developed.  Uterus was incised with a knife and incision extended laterally by tearing with the fingers.  Membranes were ruptured which returned clear fluid.  Delivery was accomplished of a 7 pound 7 ounce female infant with Apgar scores of 8 and 9.  The cord was allowed to pulse for 1 minute prior to clamping.  The placenta delivered spontaneously.  The hysterotomy wound was closed in layers of 0 PDS, the first running in the second imbricating over the first.  The visceral peritoneum was reapproximated with a running 3-0 Vicryl suture.  The pelvis was irrigated with 500 cc of normal saline and found to be dry.  The parietal peritoneum was closed with a running 3-0 Vicryl suture everting the  Daily Note     Today's date: 2023  Patient name: Mahesh Gonsalves  : 1966  MRN: [id number]  Referring provider: Mayo Butterfield DO  Dx:   Encounter Diagnosis     ICD-10-CM    1. Chronic right-sided low back pain without sciatica  M54.50     G89.29       2. Left leg pain  M79.605                      Subjective: Pt reports that she was unable to perform HEP because she was busy with her grandchildren. She stated that she was crawling around with them, resulting in an increase in R knee pain. Objective: See treatment diary below  - passive SLR and slump B/L  + R femoral neural tension     Assessment: Sciatic neural tension assessed; negative passive SLR and slump test B/L, therefore sciatic nerve glides were not performed today. Femoral neural tension assessed and + in RLE, therefore femoral nerve glides were performed and added to HEP. Increased distal hamstring symptoms post femoral nerve glides therefore performed HS STM with increased relief. Pt demonstrated appropriate fatigue with SLR and VMO, indicating those exercises are currently an appropriate intensity. While performing SLR pt was verbally cued to decrease hip ROM to reduce hip flexor compensation; pt demonstrated good carryover. Plan: Continue per plan of care. Precautions: HTN      Manuals            Sciatic nerve glides R  Assessed           STM R hamstring   RM                                     Neuro Re-Ed             SL stance on foam R             Mini squats on bosu ball                                                                              Ther Ex             Bike  5 min           Supine sciatic nerve glides R HEP            SLR R HEP 2x10           Femoral nerve tensioner R  2x10            VMO SLR R  2x10           Clamshells R  3x10           LAQ R  3x10 2#           Seated HS stretch   3x30" B/L           Standing hip abduction B/L  [place] TB  2x10 ea.              Multi hip machine (hip flex, hip ext, abduction) B/L             Leg press                                       Ther Activity             Eccentric sit to stands   2x10           Lateral step downs R             Step up and overs              Gait Training                                       Modalities rough edge into the wound to prevent adhesion formation.  The muscle was reapproximated with 3-0 Vicryl suture.  The fascia was closed with double-stranded 0 PDS suture.  The subcutaneous tissue was closed with 3 layers of 3-0 Vicryl suture and one layer of 3-0 Vicryl subcuticular on the skin.  Estimated blood loss was 400 cc.  The patient tolerated the operation well and was returned to the recovery room in good condition.  Sponge and needle counts were correct.  The Pratt catheter was draining clear urine.    Chele Claros MD

## 2023-09-05 ENCOUNTER — HOSPITAL ENCOUNTER (OUTPATIENT)
Facility: HOSPITAL | Age: 28
End: 2023-09-05
Admitting: INTERNAL MEDICINE
Payer: COMMERCIAL

## 2023-09-05 ENCOUNTER — HOSPITAL ENCOUNTER (OUTPATIENT)
Facility: HOSPITAL | Age: 28
Discharge: HOME | End: 2023-09-05
Attending: INTERNAL MEDICINE | Admitting: INTERNAL MEDICINE
Payer: COMMERCIAL

## 2023-09-05 VITALS
SYSTOLIC BLOOD PRESSURE: 134 MMHG | BODY MASS INDEX: 45.99 KG/M2 | DIASTOLIC BLOOD PRESSURE: 73 MMHG | TEMPERATURE: 98.1 F | OXYGEN SATURATION: 97 % | WEIGHT: 293 LBS | HEIGHT: 67 IN | RESPIRATION RATE: 16 BRPM

## 2023-09-05 DIAGNOSIS — O13.9 GESTATIONAL HYPERTENSION AFFECTING EIGHTH PREGNANCY: ICD-10-CM

## 2023-09-05 DIAGNOSIS — O09.40 GESTATIONAL HYPERTENSION AFFECTING EIGHTH PREGNANCY: ICD-10-CM

## 2023-09-05 LAB
ALBUMIN MFR UR ELPH: 17.6 MG/DL
ALBUMIN SERPL BCG-MCNC: 3.3 G/DL (ref 3.5–5.2)
ALP SERPL-CCNC: 121 U/L (ref 35–104)
ALT SERPL W P-5'-P-CCNC: 9 U/L (ref 0–50)
ANION GAP SERPL CALCULATED.3IONS-SCNC: 10 MMOL/L (ref 7–15)
AST SERPL W P-5'-P-CCNC: 17 U/L (ref 0–45)
BASOPHILS # BLD AUTO: 0 10E3/UL (ref 0–0.2)
BASOPHILS NFR BLD AUTO: 0 %
BILIRUB SERPL-MCNC: 0.2 MG/DL
BUN SERPL-MCNC: 7.3 MG/DL (ref 6–20)
CALCIUM SERPL-MCNC: 9.2 MG/DL (ref 8.6–10)
CHLORIDE SERPL-SCNC: 103 MMOL/L (ref 98–107)
CREAT SERPL-MCNC: 0.53 MG/DL (ref 0.51–0.95)
CREAT UR-MCNC: 207.3 MG/DL
DEPRECATED HCO3 PLAS-SCNC: 22 MMOL/L (ref 22–29)
EOSINOPHIL # BLD AUTO: 0.1 10E3/UL (ref 0–0.7)
EOSINOPHIL NFR BLD AUTO: 1 %
ERYTHROCYTE [DISTWIDTH] IN BLOOD BY AUTOMATED COUNT: 12.5 % (ref 10–15)
GFR SERPL CREATININE-BSD FRML MDRD: >90 ML/MIN/1.73M2
GLUCOSE SERPL-MCNC: 110 MG/DL (ref 70–99)
HCT VFR BLD AUTO: 33.1 % (ref 35–47)
HGB BLD-MCNC: 10.9 G/DL (ref 11.7–15.7)
HOLD SPECIMEN: NORMAL
HOLD SPECIMEN: NORMAL
IMM GRANULOCYTES # BLD: 0.1 10E3/UL
IMM GRANULOCYTES NFR BLD: 1 %
LDH SERPL L TO P-CCNC: 137 U/L (ref 0–250)
LYMPHOCYTES # BLD AUTO: 2 10E3/UL (ref 0.8–5.3)
LYMPHOCYTES NFR BLD AUTO: 19 %
MCH RBC QN AUTO: 28.7 PG (ref 26.5–33)
MCHC RBC AUTO-ENTMCNC: 32.9 G/DL (ref 31.5–36.5)
MCV RBC AUTO: 87 FL (ref 78–100)
MONOCYTES # BLD AUTO: 0.6 10E3/UL (ref 0–1.3)
MONOCYTES NFR BLD AUTO: 5 %
NEUTROPHILS # BLD AUTO: 8 10E3/UL (ref 1.6–8.3)
NEUTROPHILS NFR BLD AUTO: 74 %
NRBC # BLD AUTO: 0 10E3/UL
NRBC BLD AUTO-RTO: 0 /100
PLATELET # BLD AUTO: 343 10E3/UL (ref 150–450)
POTASSIUM SERPL-SCNC: 3.8 MMOL/L (ref 3.4–5.3)
PROT SERPL-MCNC: 6.8 G/DL (ref 6.4–8.3)
PROT/CREAT 24H UR: 0.08 MG/MG CR (ref 0–0.2)
RBC # BLD AUTO: 3.8 10E6/UL (ref 3.8–5.2)
SODIUM SERPL-SCNC: 135 MMOL/L (ref 136–145)
URATE SERPL-MCNC: 5.2 MG/DL (ref 2.4–5.7)
WBC # BLD AUTO: 10.8 10E3/UL (ref 4–11)

## 2023-09-05 PROCEDURE — 250N000013 HC RX MED GY IP 250 OP 250 PS 637: Performed by: INTERNAL MEDICINE

## 2023-09-05 PROCEDURE — 83615 LACTATE (LD) (LDH) ENZYME: CPT | Performed by: INTERNAL MEDICINE

## 2023-09-05 PROCEDURE — G0463 HOSPITAL OUTPT CLINIC VISIT: HCPCS

## 2023-09-05 PROCEDURE — 36415 COLL VENOUS BLD VENIPUNCTURE: CPT | Performed by: INTERNAL MEDICINE

## 2023-09-05 PROCEDURE — 85025 COMPLETE CBC W/AUTO DIFF WBC: CPT | Performed by: INTERNAL MEDICINE

## 2023-09-05 PROCEDURE — 93010 ELECTROCARDIOGRAM REPORT: CPT | Performed by: INTERNAL MEDICINE

## 2023-09-05 PROCEDURE — 93005 ELECTROCARDIOGRAM TRACING: CPT

## 2023-09-05 PROCEDURE — 80053 COMPREHEN METABOLIC PANEL: CPT | Performed by: INTERNAL MEDICINE

## 2023-09-05 PROCEDURE — 84156 ASSAY OF PROTEIN URINE: CPT | Performed by: INTERNAL MEDICINE

## 2023-09-05 PROCEDURE — 84550 ASSAY OF BLOOD/URIC ACID: CPT | Performed by: INTERNAL MEDICINE

## 2023-09-05 RX ORDER — LIDOCAINE 40 MG/G
CREAM TOPICAL
Status: DISCONTINUED | OUTPATIENT
Start: 2023-09-05 | End: 2023-09-05 | Stop reason: HOSPADM

## 2023-09-05 RX ORDER — NIFEDIPINE 30 MG
30 TABLET, EXTENDED RELEASE ORAL 2 TIMES DAILY
Status: DISCONTINUED | OUTPATIENT
Start: 2023-09-05 | End: 2023-09-05 | Stop reason: HOSPADM

## 2023-09-05 RX ORDER — DEXTROAMPHETAMINE SACCHARATE, AMPHETAMINE ASPARTATE MONOHYDRATE, DEXTROAMPHETAMINE SULFATE AND AMPHETAMINE SULFATE 6.25; 6.25; 6.25; 6.25 MG/1; MG/1; MG/1; MG/1
15 CAPSULE, EXTENDED RELEASE ORAL EVERY MORNING
Status: ON HOLD | COMMUNITY
End: 2023-10-26

## 2023-09-05 RX ADMIN — NIFEDIPINE 30 MG: 30 TABLET, EXTENDED RELEASE ORAL at 18:52

## 2023-09-05 ASSESSMENT — ACTIVITIES OF DAILY LIVING (ADL)
CONCENTRATING,_REMEMBERING_OR_MAKING_DECISIONS_DIFFICULTY: NO
DRESSING/BATHING_DIFFICULTY: NO
CHANGE_IN_FUNCTIONAL_STATUS_SINCE_ONSET_OF_CURRENT_ILLNESS/INJURY: NO
DOING_ERRANDS_INDEPENDENTLY_DIFFICULTY: NO
TOILETING_ISSUES: NO
FALL_HISTORY_WITHIN_LAST_SIX_MONTHS: NO
ADLS_ACUITY_SCORE: 18
ADLS_ACUITY_SCORE: 18
WEAR_GLASSES_OR_BLIND: NO
DIFFICULTY_EATING/SWALLOWING: NO
WALKING_OR_CLIMBING_STAIRS_DIFFICULTY: NO

## 2023-09-05 NOTE — PLAN OF CARE
Kiera Iraheta a 27 year old  28w6d arrives for evaluation of Hypertension after Elevated BPs on self-check    VS: BP:136/74         P:Data Unavailable T:   Temp Readings from Last 1 Encounters:   22 98  F (36.7  C) (Oral)    Headache: Present  Visual changes: Present  Epigastric pain: Absent  Reflexes: +2  Clonus: Absent    Complications this pregnancy: Chronic hypertension managed by labetolol.    OB Arrival Protocol entered. UA and [place] applied for NST.  Labs collected:     Provider updated to above and orders received. If heart rate stays elevated, will plan for EKG.    Dinora Reid RN  2023 5:56 PM

## 2023-09-05 NOTE — PLAN OF CARE
Telephone call with Dr. Frausto. Updated on but not limited to: maternal vital signs, fetal HR tracing, ECG of sinus tachycardia, and lab results. Plan to start patient on 30mg nifedipine BID. First dose to be given now, then after a reactive NST, ok to discharge.

## 2023-09-06 LAB
ATRIAL RATE - MUSE: 114 BPM
DIASTOLIC BLOOD PRESSURE - MUSE: NORMAL MMHG
INTERPRETATION ECG - MUSE: NORMAL
P AXIS - MUSE: 60 DEGREES
PR INTERVAL - MUSE: 136 MS
QRS DURATION - MUSE: 96 MS
QT - MUSE: 336 MS
QTC - MUSE: 463 MS
R AXIS - MUSE: 44 DEGREES
SYSTOLIC BLOOD PRESSURE - MUSE: NORMAL MMHG
T AXIS - MUSE: 34 DEGREES
VENTRICULAR RATE- MUSE: 114 BPM

## 2023-09-06 NOTE — PROGRESS NOTES
Pt was seen for elevated BP and was medicated with Nifedipine ER 30 mg and is to  her RX for Nifedipine. Pt provided discharge instructions, denies questions or concerns. All personal belongings and instructions were sent with pt who left for home. Pt will F/U in clinic as scheduled and call with any questions or concerns.

## 2023-09-06 NOTE — DISCHARGE INSTRUCTIONS
Discharge Instruction for Undelivered Patients      You were seen for:  Elevated BP  We Consulted: Dr Frausto  You had (Test or Medicine):NST, labs, EKG, and Nifedipine ER 30 mg     Diet:   Drink 8 to 12 glasses of liquids (milk, juice, water) every day.  You may eat meals and snacks.     Activity:  Call your doctor or nurse midwife if your baby is moving less than usual.     Call your provider if you notice:  Swelling in your face or increased swelling in your hands or legs.  Headaches that are not relieved by Tylenol (acetaminophen).  Changes in your vision (blurring: seeing spots or stars.)  Nausea (sick to your stomach) and vomiting (throwing up).   Weight gain of 5 pounds or more per week.  Heartburn that doesn't go away.  Signs of bladder infection: pain when you urinate (use the toilet), need to go more often and more urgently.  The bag of [place] (rupture of membranes) breaks, or you notice leaking in your underwear.  Bright red blood in your underwear.  Abdominal (lower belly) or stomach pain.  For first baby: Contractions (tightening) less than 5 minutes apart for one hour or more.  Second (plus) baby: Contractions (tightening) less than 10 minutes apart and getting stronger.  *If less than 34 weeks: Contractions (tightening) more than 6 times in one hour.  Increase or change in vaginal discharge (note the color and amount)  Other: Call with any questions or concerns, and  your Nifedipine that was ordered for you.    Follow-up:  As scheduled in the clinic

## 2023-09-06 NOTE — PROGRESS NOTES
Dr Carson called and notified of pt lab results along with blood pressure trends. MD notified of FHT tracing AGA and no contractions. MD orders to discharge patient.

## 2023-10-23 ENCOUNTER — ANESTHESIA (OUTPATIENT)
Dept: OBGYN | Facility: HOSPITAL | Age: 28
End: 2023-10-23
Payer: COMMERCIAL

## 2023-10-23 ENCOUNTER — ANESTHESIA EVENT (OUTPATIENT)
Dept: OBGYN | Facility: HOSPITAL | Age: 28
End: 2023-10-23
Payer: COMMERCIAL

## 2023-10-23 ENCOUNTER — HOSPITAL ENCOUNTER (INPATIENT)
Facility: HOSPITAL | Age: 28
LOS: 3 days | Discharge: HOME | End: 2023-10-26
Attending: INTERNAL MEDICINE | Admitting: OBSTETRICS & GYNECOLOGY
Payer: COMMERCIAL

## 2023-10-23 DIAGNOSIS — O09.40 GESTATIONAL HYPERTENSION AFFECTING EIGHTH PREGNANCY: ICD-10-CM

## 2023-10-23 DIAGNOSIS — O14.93 PRE-ECLAMPSIA IN THIRD TRIMESTER: ICD-10-CM

## 2023-10-23 DIAGNOSIS — O13.9 GESTATIONAL HYPERTENSION AFFECTING EIGHTH PREGNANCY: ICD-10-CM

## 2023-10-23 DIAGNOSIS — Z98.891 S/P REPEAT LOW TRANSVERSE C-SECTION: Primary | ICD-10-CM

## 2023-10-23 LAB
ABO/RH(D): NORMAL
ALBUMIN MFR UR ELPH: 21.8 MG/DL
ALBUMIN SERPL BCG-MCNC: 3.5 G/DL (ref 3.5–5.2)
ALP SERPL-CCNC: 178 U/L (ref 35–104)
ALT SERPL W P-5'-P-CCNC: 14 U/L (ref 0–50)
ANION GAP SERPL CALCULATED.3IONS-SCNC: 10 MMOL/L (ref 7–15)
ANTIBODY SCREEN: NEGATIVE
APTT PPP: 23 SECONDS (ref 22–38)
AST SERPL W P-5'-P-CCNC: 19 U/L (ref 0–45)
BILIRUB SERPL-MCNC: 0.2 MG/DL
BUN SERPL-MCNC: 7 MG/DL (ref 6–20)
CALCIUM SERPL-MCNC: 9.8 MG/DL (ref 8.6–10)
CHLORIDE SERPL-SCNC: 102 MMOL/L (ref 98–107)
CREAT SERPL-MCNC: 0.52 MG/DL (ref 0.51–0.95)
CREAT UR-MCNC: 162.6 MG/DL
DEPRECATED HCO3 PLAS-SCNC: 24 MMOL/L (ref 22–29)
EGFRCR SERPLBLD CKD-EPI 2021: >90 ML/MIN/1.73M2
ERYTHROCYTE [DISTWIDTH] IN BLOOD BY AUTOMATED COUNT: 12.9 % (ref 10–15)
ERYTHROCYTE [DISTWIDTH] IN BLOOD BY AUTOMATED COUNT: 12.9 % (ref 10–15)
GLUCOSE SERPL-MCNC: 86 MG/DL (ref 70–99)
HCT VFR BLD AUTO: 30.5 % (ref 35–47)
HCT VFR BLD AUTO: 35 % (ref 35–47)
HGB BLD-MCNC: 11.5 G/DL (ref 11.7–15.7)
HGB BLD-MCNC: 9.9 G/DL (ref 11.7–15.7)
HOLD SPECIMEN: NORMAL
INR PPP: 1.03 (ref 0.85–1.15)
LDH SERPL L TO P-CCNC: 144 U/L (ref 0–250)
MCH RBC QN AUTO: 27.1 PG (ref 26.5–33)
MCH RBC QN AUTO: 27.1 PG (ref 26.5–33)
MCHC RBC AUTO-ENTMCNC: 32.5 G/DL (ref 31.5–36.5)
MCHC RBC AUTO-ENTMCNC: 32.9 G/DL (ref 31.5–36.5)
MCV RBC AUTO: 83 FL (ref 78–100)
MCV RBC AUTO: 84 FL (ref 78–100)
PLATELET # BLD AUTO: 366 10E3/UL (ref 150–450)
PLATELET # BLD AUTO: 412 10E3/UL (ref 150–450)
POTASSIUM SERPL-SCNC: 4.5 MMOL/L (ref 3.4–5.3)
PROT SERPL-MCNC: 7.2 G/DL (ref 6.4–8.3)
PROT/CREAT 24H UR: 0.13 MG/MG CR (ref 0–0.2)
RBC # BLD AUTO: 3.65 10E6/UL (ref 3.8–5.2)
RBC # BLD AUTO: 4.24 10E6/UL (ref 3.8–5.2)
SODIUM SERPL-SCNC: 136 MMOL/L (ref 135–145)
SPECIMEN EXPIRATION DATE: NORMAL
WBC # BLD AUTO: 10.6 10E3/UL (ref 4–11)
WBC # BLD AUTO: 12.4 10E3/UL (ref 4–11)

## 2023-10-23 PROCEDURE — 370N000017 HC ANESTHESIA TECHNICAL FEE, PER MIN: Performed by: OBSTETRICS & GYNECOLOGY

## 2023-10-23 PROCEDURE — 272N000001 HC OR GENERAL SUPPLY STERILE: Performed by: OBSTETRICS & GYNECOLOGY

## 2023-10-23 PROCEDURE — 36415 COLL VENOUS BLD VENIPUNCTURE: CPT | Performed by: ANESTHESIOLOGY

## 2023-10-23 PROCEDURE — 250N000011 HC RX IP 250 OP 636: Mod: JZ | Performed by: ANESTHESIOLOGY

## 2023-10-23 PROCEDURE — 258N000003 HC RX IP 258 OP 636: Performed by: OBSTETRICS & GYNECOLOGY

## 2023-10-23 PROCEDURE — 360N000076 HC SURGERY LEVEL 3, PER MIN: Performed by: OBSTETRICS & GYNECOLOGY

## 2023-10-23 PROCEDURE — 250N000011 HC RX IP 250 OP 636: Performed by: OBSTETRICS & GYNECOLOGY

## 2023-10-23 PROCEDURE — 80053 COMPREHEN METABOLIC PANEL: CPT | Performed by: OBSTETRICS & GYNECOLOGY

## 2023-10-23 PROCEDURE — 250N000009 HC RX 250: Performed by: NURSE ANESTHETIST, CERTIFIED REGISTERED

## 2023-10-23 PROCEDURE — 258N000003 HC RX IP 258 OP 636: Performed by: NURSE ANESTHETIST, CERTIFIED REGISTERED

## 2023-10-23 PROCEDURE — 250N000011 HC RX IP 250 OP 636: Mod: JZ | Performed by: OBSTETRICS & GYNECOLOGY

## 2023-10-23 PROCEDURE — 88307 TISSUE EXAM BY PATHOLOGIST: CPT | Mod: 26 | Performed by: PATHOLOGY

## 2023-10-23 PROCEDURE — 86901 BLOOD TYPING SEROLOGIC RH(D): CPT | Performed by: OBSTETRICS & GYNECOLOGY

## 2023-10-23 PROCEDURE — 250N000013 HC RX MED GY IP 250 OP 250 PS 637: Performed by: OBSTETRICS & GYNECOLOGY

## 2023-10-23 PROCEDURE — 84156 ASSAY OF PROTEIN URINE: CPT | Performed by: OBSTETRICS & GYNECOLOGY

## 2023-10-23 PROCEDURE — 999N000127 HC STATISTIC PERIPHERAL IV START W US GUIDANCE

## 2023-10-23 PROCEDURE — 86850 RBC ANTIBODY SCREEN: CPT | Performed by: OBSTETRICS & GYNECOLOGY

## 2023-10-23 PROCEDURE — 83615 LACTATE (LD) (LDH) ENZYME: CPT | Performed by: OBSTETRICS & GYNECOLOGY

## 2023-10-23 PROCEDURE — 250N000011 HC RX IP 250 OP 636: Mod: JZ | Performed by: NURSE ANESTHETIST, CERTIFIED REGISTERED

## 2023-10-23 PROCEDURE — 999N000249 HC STATISTIC C-SECTION ON UNIT

## 2023-10-23 PROCEDURE — 85730 THROMBOPLASTIN TIME PARTIAL: CPT | Performed by: ANESTHESIOLOGY

## 2023-10-23 PROCEDURE — 120N000001 HC R&B MED SURG/OB

## 2023-10-23 PROCEDURE — 36415 COLL VENOUS BLD VENIPUNCTURE: CPT | Performed by: OBSTETRICS & GYNECOLOGY

## 2023-10-23 PROCEDURE — 88307 TISSUE EXAM BY PATHOLOGIST: CPT | Mod: TC | Performed by: OBSTETRICS & GYNECOLOGY

## 2023-10-23 PROCEDURE — 86780 TREPONEMA PALLIDUM: CPT | Performed by: OBSTETRICS & GYNECOLOGY

## 2023-10-23 PROCEDURE — 85027 COMPLETE CBC AUTOMATED: CPT | Performed by: OBSTETRICS & GYNECOLOGY

## 2023-10-23 PROCEDURE — 85610 PROTHROMBIN TIME: CPT | Performed by: ANESTHESIOLOGY

## 2023-10-23 RX ORDER — MISOPROSTOL 200 UG/1
800 TABLET ORAL
Status: DISCONTINUED | OUTPATIENT
Start: 2023-10-23 | End: 2023-10-23 | Stop reason: HOSPADM

## 2023-10-23 RX ORDER — SODIUM CHLORIDE, SODIUM LACTATE, POTASSIUM CHLORIDE, CALCIUM CHLORIDE 600; 310; 30; 20 MG/100ML; MG/100ML; MG/100ML; MG/100ML
INJECTION, SOLUTION INTRAVENOUS CONTINUOUS PRN
Status: DISCONTINUED | OUTPATIENT
Start: 2023-10-23 | End: 2023-10-23

## 2023-10-23 RX ORDER — LABETALOL HYDROCHLORIDE 5 MG/ML
20-80 INJECTION, SOLUTION INTRAVENOUS EVERY 10 MIN PRN
Status: DISCONTINUED | OUTPATIENT
Start: 2023-10-23 | End: 2023-10-26 | Stop reason: HOSPADM

## 2023-10-23 RX ORDER — OXYTOCIN/0.9 % SODIUM CHLORIDE 30/500 ML
340 PLASTIC BAG, INJECTION (ML) INTRAVENOUS CONTINUOUS PRN
Status: DISCONTINUED | OUTPATIENT
Start: 2023-10-23 | End: 2023-10-26 | Stop reason: HOSPADM

## 2023-10-23 RX ORDER — MAGNESIUM SULFATE HEPTAHYDRATE 40 MG/ML
2 INJECTION, SOLUTION INTRAVENOUS
Status: DISCONTINUED | OUTPATIENT
Start: 2023-10-23 | End: 2023-10-26 | Stop reason: HOSPADM

## 2023-10-23 RX ORDER — DEXTROSE, SODIUM CHLORIDE, SODIUM LACTATE, POTASSIUM CHLORIDE, AND CALCIUM CHLORIDE 5; .6; .31; .03; .02 G/100ML; G/100ML; G/100ML; G/100ML; G/100ML
INJECTION, SOLUTION INTRAVENOUS CONTINUOUS
Status: DISCONTINUED | OUTPATIENT
Start: 2023-10-23 | End: 2023-10-26 | Stop reason: HOSPADM

## 2023-10-23 RX ORDER — CALCIUM GLUCONATE 94 MG/ML
1 INJECTION, SOLUTION INTRAVENOUS
Status: DISCONTINUED | OUTPATIENT
Start: 2023-10-23 | End: 2023-10-26 | Stop reason: HOSPADM

## 2023-10-23 RX ORDER — MAGNESIUM SULFATE 4 G/50ML
4 INJECTION INTRAVENOUS ONCE
Status: COMPLETED | OUTPATIENT
Start: 2023-10-23 | End: 2023-10-23

## 2023-10-23 RX ORDER — SIMETHICONE 80 MG
80 TABLET,CHEWABLE ORAL 4 TIMES DAILY PRN
Status: DISCONTINUED | OUTPATIENT
Start: 2023-10-23 | End: 2023-10-26 | Stop reason: HOSPADM

## 2023-10-23 RX ORDER — MAGNESIUM SULFATE HEPTAHYDRATE 40 MG/ML
2 INJECTION, SOLUTION INTRAVENOUS ONCE
Status: COMPLETED | OUTPATIENT
Start: 2023-10-23 | End: 2023-10-23

## 2023-10-23 RX ORDER — MISOPROSTOL 200 UG/1
400 TABLET ORAL
Status: DISCONTINUED | OUTPATIENT
Start: 2023-10-23 | End: 2023-10-26 | Stop reason: HOSPADM

## 2023-10-23 RX ORDER — NIFEDIPINE 30 MG
30 TABLET, EXTENDED RELEASE ORAL 2 TIMES DAILY
COMMUNITY

## 2023-10-23 RX ORDER — OXYTOCIN 10 [USP'U]/ML
10 INJECTION, SOLUTION INTRAMUSCULAR; INTRAVENOUS
Status: DISCONTINUED | OUTPATIENT
Start: 2023-10-23 | End: 2023-10-23 | Stop reason: HOSPADM

## 2023-10-23 RX ORDER — MAGNESIUM SULFATE IN WATER 40 MG/ML
2 INJECTION, SOLUTION INTRAVENOUS CONTINUOUS
Status: DISCONTINUED | OUTPATIENT
Start: 2023-10-23 | End: 2023-10-26 | Stop reason: HOSPADM

## 2023-10-23 RX ORDER — LABETALOL HYDROCHLORIDE 5 MG/ML
20 INJECTION, SOLUTION INTRAVENOUS
Status: DISCONTINUED | OUTPATIENT
Start: 2023-10-23 | End: 2023-10-26 | Stop reason: HOSPADM

## 2023-10-23 RX ORDER — CITALOPRAM HYDROBROMIDE 40 MG/1
40 TABLET ORAL DAILY
Status: DISCONTINUED | OUTPATIENT
Start: 2023-10-23 | End: 2023-10-26 | Stop reason: HOSPADM

## 2023-10-23 RX ORDER — LIDOCAINE 40 MG/G
CREAM TOPICAL
Status: DISCONTINUED | OUTPATIENT
Start: 2023-10-23 | End: 2023-10-26 | Stop reason: HOSPADM

## 2023-10-23 RX ORDER — MODIFIED LANOLIN
OINTMENT (GRAM) TOPICAL
Status: DISCONTINUED | OUTPATIENT
Start: 2023-10-23 | End: 2023-10-26 | Stop reason: HOSPADM

## 2023-10-23 RX ORDER — MORPHINE SULFATE 0.5 MG/ML
150 INJECTION, SOLUTION EPIDURAL; INTRATHECAL; INTRAVENOUS ONCE
Status: DISCONTINUED | OUTPATIENT
Start: 2023-10-23 | End: 2023-10-23 | Stop reason: HOSPADM

## 2023-10-23 RX ORDER — MISOPROSTOL 200 UG/1
400 TABLET ORAL
Status: DISCONTINUED | OUTPATIENT
Start: 2023-10-23 | End: 2023-10-23 | Stop reason: HOSPADM

## 2023-10-23 RX ORDER — NALOXONE HYDROCHLORIDE 0.4 MG/ML
0.2 INJECTION, SOLUTION INTRAMUSCULAR; INTRAVENOUS; SUBCUTANEOUS
Status: DISCONTINUED | OUTPATIENT
Start: 2023-10-23 | End: 2023-10-26 | Stop reason: HOSPADM

## 2023-10-23 RX ORDER — CITRIC ACID/SODIUM CITRATE 334-500MG
30 SOLUTION, ORAL ORAL
Status: COMPLETED | OUTPATIENT
Start: 2023-10-23 | End: 2023-10-23

## 2023-10-23 RX ORDER — ONDANSETRON 2 MG/ML
INJECTION INTRAMUSCULAR; INTRAVENOUS PRN
Status: DISCONTINUED | OUTPATIENT
Start: 2023-10-23 | End: 2023-10-23

## 2023-10-23 RX ORDER — SODIUM CHLORIDE, SODIUM LACTATE, POTASSIUM CHLORIDE, CALCIUM CHLORIDE 600; 310; 30; 20 MG/100ML; MG/100ML; MG/100ML; MG/100ML
INJECTION, SOLUTION INTRAVENOUS CONTINUOUS
Status: DISCONTINUED | OUTPATIENT
Start: 2023-10-23 | End: 2023-10-23 | Stop reason: HOSPADM

## 2023-10-23 RX ORDER — NIFEDIPINE 10 MG/1
10-20 CAPSULE ORAL
Status: DISCONTINUED | OUTPATIENT
Start: 2023-10-23 | End: 2023-10-26 | Stop reason: HOSPADM

## 2023-10-23 RX ORDER — BUPIVACAINE HYDROCHLORIDE 7.5 MG/ML
INJECTION, SOLUTION INTRASPINAL
Status: COMPLETED | OUTPATIENT
Start: 2023-10-23 | End: 2023-10-23

## 2023-10-23 RX ORDER — KETOROLAC TROMETHAMINE 30 MG/ML
INJECTION, SOLUTION INTRAMUSCULAR; INTRAVENOUS PRN
Status: DISCONTINUED | OUTPATIENT
Start: 2023-10-23 | End: 2023-10-23

## 2023-10-23 RX ORDER — LOPERAMIDE HCL 2 MG
4 CAPSULE ORAL ONCE
Status: COMPLETED | OUTPATIENT
Start: 2023-10-23 | End: 2023-10-23

## 2023-10-23 RX ORDER — CARBOPROST TROMETHAMINE 250 UG/ML
250 INJECTION, SOLUTION INTRAMUSCULAR
Status: DISCONTINUED | OUTPATIENT
Start: 2023-10-23 | End: 2023-10-26 | Stop reason: HOSPADM

## 2023-10-23 RX ORDER — LABETALOL 200 MG/1
400 TABLET, FILM COATED ORAL 2 TIMES DAILY
Status: DISCONTINUED | OUTPATIENT
Start: 2023-10-23 | End: 2023-10-26 | Stop reason: HOSPADM

## 2023-10-23 RX ORDER — MAGNESIUM SULFATE 4 G/50ML
4 INJECTION INTRAVENOUS
Status: DISCONTINUED | OUTPATIENT
Start: 2023-10-23 | End: 2023-10-26 | Stop reason: HOSPADM

## 2023-10-23 RX ORDER — LIDOCAINE HCL/EPINEPHRINE/PF 2%-1:200K
VIAL (ML) INJECTION PRN
Status: DISCONTINUED | OUTPATIENT
Start: 2023-10-23 | End: 2023-10-23

## 2023-10-23 RX ORDER — KETOROLAC TROMETHAMINE 30 MG/ML
30 INJECTION, SOLUTION INTRAMUSCULAR; INTRAVENOUS EVERY 6 HOURS
Status: COMPLETED | OUTPATIENT
Start: 2023-10-24 | End: 2023-10-24

## 2023-10-23 RX ORDER — METOCLOPRAMIDE 10 MG/1
10 TABLET ORAL EVERY 6 HOURS PRN
Status: DISCONTINUED | OUTPATIENT
Start: 2023-10-23 | End: 2023-10-26 | Stop reason: HOSPADM

## 2023-10-23 RX ORDER — MORPHINE SULFATE 1 MG/ML
INJECTION, SOLUTION EPIDURAL; INTRATHECAL; INTRAVENOUS
Status: COMPLETED | OUTPATIENT
Start: 2023-10-23 | End: 2023-10-23

## 2023-10-23 RX ORDER — AMOXICILLIN 250 MG
1 CAPSULE ORAL 2 TIMES DAILY
Status: DISCONTINUED | OUTPATIENT
Start: 2023-10-23 | End: 2023-10-26 | Stop reason: HOSPADM

## 2023-10-23 RX ORDER — PROCHLORPERAZINE 25 MG
25 SUPPOSITORY, RECTAL RECTAL EVERY 12 HOURS PRN
Status: DISCONTINUED | OUTPATIENT
Start: 2023-10-23 | End: 2023-10-26 | Stop reason: HOSPADM

## 2023-10-23 RX ORDER — OXYCODONE HYDROCHLORIDE 5 MG/1
5 TABLET ORAL EVERY 4 HOURS PRN
Status: DISCONTINUED | OUTPATIENT
Start: 2023-10-23 | End: 2023-10-25

## 2023-10-23 RX ORDER — SODIUM CHLORIDE, SODIUM LACTATE, POTASSIUM CHLORIDE, CALCIUM CHLORIDE 600; 310; 30; 20 MG/100ML; MG/100ML; MG/100ML; MG/100ML
10-125 INJECTION, SOLUTION INTRAVENOUS CONTINUOUS
Status: DISCONTINUED | OUTPATIENT
Start: 2023-10-23 | End: 2023-10-26 | Stop reason: HOSPADM

## 2023-10-23 RX ORDER — CEFAZOLIN SODIUM/WATER 3 G/30 ML
3 SYRINGE (ML) INTRAVENOUS SEE ADMIN INSTRUCTIONS
Status: DISCONTINUED | OUTPATIENT
Start: 2023-10-23 | End: 2023-10-23 | Stop reason: HOSPADM

## 2023-10-23 RX ORDER — OXYTOCIN 10 [USP'U]/ML
10 INJECTION, SOLUTION INTRAMUSCULAR; INTRAVENOUS
Status: DISCONTINUED | OUTPATIENT
Start: 2023-10-23 | End: 2023-10-26 | Stop reason: HOSPADM

## 2023-10-23 RX ORDER — CARBOPROST TROMETHAMINE 250 UG/ML
INJECTION, SOLUTION INTRAMUSCULAR PRN
Status: DISCONTINUED | OUTPATIENT
Start: 2023-10-23 | End: 2023-10-23

## 2023-10-23 RX ORDER — NIFEDIPINE 30 MG
30 TABLET, EXTENDED RELEASE ORAL 2 TIMES DAILY
Status: DISCONTINUED | OUTPATIENT
Start: 2023-10-23 | End: 2023-10-26 | Stop reason: HOSPADM

## 2023-10-23 RX ORDER — AMOXICILLIN 250 MG
2 CAPSULE ORAL 2 TIMES DAILY
Status: DISCONTINUED | OUTPATIENT
Start: 2023-10-23 | End: 2023-10-26 | Stop reason: HOSPADM

## 2023-10-23 RX ORDER — BISACODYL 10 MG
10 SUPPOSITORY, RECTAL RECTAL DAILY PRN
Status: DISCONTINUED | OUTPATIENT
Start: 2023-10-25 | End: 2023-10-26 | Stop reason: HOSPADM

## 2023-10-23 RX ORDER — ACETAMINOPHEN 325 MG/1
975 TABLET ORAL EVERY 6 HOURS
Status: DISCONTINUED | OUTPATIENT
Start: 2023-10-23 | End: 2023-10-26 | Stop reason: HOSPADM

## 2023-10-23 RX ORDER — LIDOCAINE 40 MG/G
CREAM TOPICAL
Status: DISCONTINUED | OUTPATIENT
Start: 2023-10-23 | End: 2023-10-23 | Stop reason: HOSPADM

## 2023-10-23 RX ORDER — ONDANSETRON 2 MG/ML
4 INJECTION INTRAMUSCULAR; INTRAVENOUS EVERY 6 HOURS PRN
Status: DISCONTINUED | OUTPATIENT
Start: 2023-10-23 | End: 2023-10-26 | Stop reason: HOSPADM

## 2023-10-23 RX ORDER — MISOPROSTOL 200 UG/1
800 TABLET ORAL
Status: DISCONTINUED | OUTPATIENT
Start: 2023-10-23 | End: 2023-10-26 | Stop reason: HOSPADM

## 2023-10-23 RX ORDER — CEFAZOLIN SODIUM/WATER 3 G/30 ML
3 SYRINGE (ML) INTRAVENOUS
Status: COMPLETED | OUTPATIENT
Start: 2023-10-23 | End: 2023-10-23

## 2023-10-23 RX ORDER — IBUPROFEN 800 MG/1
800 TABLET, FILM COATED ORAL EVERY 6 HOURS
Status: DISCONTINUED | OUTPATIENT
Start: 2023-10-24 | End: 2023-10-26 | Stop reason: HOSPADM

## 2023-10-23 RX ORDER — ONDANSETRON 4 MG/1
4 TABLET, ORALLY DISINTEGRATING ORAL EVERY 6 HOURS PRN
Status: DISCONTINUED | OUTPATIENT
Start: 2023-10-23 | End: 2023-10-26 | Stop reason: HOSPADM

## 2023-10-23 RX ORDER — OXYTOCIN/0.9 % SODIUM CHLORIDE 30/500 ML
PLASTIC BAG, INJECTION (ML) INTRAVENOUS CONTINUOUS PRN
Status: DISCONTINUED | OUTPATIENT
Start: 2023-10-23 | End: 2023-10-23

## 2023-10-23 RX ORDER — PROCHLORPERAZINE MALEATE 10 MG
10 TABLET ORAL EVERY 6 HOURS PRN
Status: DISCONTINUED | OUTPATIENT
Start: 2023-10-23 | End: 2023-10-26 | Stop reason: HOSPADM

## 2023-10-23 RX ORDER — NALOXONE HYDROCHLORIDE 0.4 MG/ML
0.4 INJECTION, SOLUTION INTRAMUSCULAR; INTRAVENOUS; SUBCUTANEOUS
Status: DISCONTINUED | OUTPATIENT
Start: 2023-10-23 | End: 2023-10-26 | Stop reason: HOSPADM

## 2023-10-23 RX ORDER — METHYLERGONOVINE MALEATE 0.2 MG/ML
200 INJECTION INTRAVENOUS
Status: DISCONTINUED | OUTPATIENT
Start: 2023-10-23 | End: 2023-10-23 | Stop reason: HOSPADM

## 2023-10-23 RX ORDER — CARBOPROST TROMETHAMINE 250 UG/ML
250 INJECTION, SOLUTION INTRAMUSCULAR
Status: DISCONTINUED | OUTPATIENT
Start: 2023-10-23 | End: 2023-10-23 | Stop reason: HOSPADM

## 2023-10-23 RX ORDER — OXYTOCIN/0.9 % SODIUM CHLORIDE 30/500 ML
340 PLASTIC BAG, INJECTION (ML) INTRAVENOUS CONTINUOUS PRN
Status: DISCONTINUED | OUTPATIENT
Start: 2023-10-23 | End: 2023-10-23 | Stop reason: HOSPADM

## 2023-10-23 RX ORDER — OXYTOCIN/0.9 % SODIUM CHLORIDE 30/500 ML
100-340 PLASTIC BAG, INJECTION (ML) INTRAVENOUS CONTINUOUS PRN
Status: DISCONTINUED | OUTPATIENT
Start: 2023-10-23 | End: 2023-10-26 | Stop reason: HOSPADM

## 2023-10-23 RX ORDER — ACETAMINOPHEN 325 MG/1
975 TABLET ORAL ONCE
Status: COMPLETED | OUTPATIENT
Start: 2023-10-23 | End: 2023-10-23

## 2023-10-23 RX ORDER — FENTANYL CITRATE 50 UG/ML
INJECTION, SOLUTION INTRAMUSCULAR; INTRAVENOUS
Status: COMPLETED | OUTPATIENT
Start: 2023-10-23 | End: 2023-10-23

## 2023-10-23 RX ORDER — METHYLERGONOVINE MALEATE 0.2 MG/ML
200 INJECTION INTRAVENOUS
Status: DISCONTINUED | OUTPATIENT
Start: 2023-10-23 | End: 2023-10-26 | Stop reason: HOSPADM

## 2023-10-23 RX ORDER — HYDROCORTISONE 25 MG/G
CREAM TOPICAL 3 TIMES DAILY PRN
Status: DISCONTINUED | OUTPATIENT
Start: 2023-10-23 | End: 2023-10-26 | Stop reason: HOSPADM

## 2023-10-23 RX ORDER — HYDRALAZINE HYDROCHLORIDE 20 MG/ML
10 INJECTION INTRAMUSCULAR; INTRAVENOUS
Status: DISCONTINUED | OUTPATIENT
Start: 2023-10-23 | End: 2023-10-26 | Stop reason: HOSPADM

## 2023-10-23 RX ORDER — METOCLOPRAMIDE HYDROCHLORIDE 5 MG/ML
10 INJECTION INTRAMUSCULAR; INTRAVENOUS EVERY 6 HOURS PRN
Status: DISCONTINUED | OUTPATIENT
Start: 2023-10-23 | End: 2023-10-26 | Stop reason: HOSPADM

## 2023-10-23 RX ADMIN — CITALOPRAM HYDROBROMIDE 40 MG: 40 TABLET ORAL at 23:02

## 2023-10-23 RX ADMIN — PHENYLEPHRINE HYDROCHLORIDE 0.6 MCG/KG/MIN: 10 INJECTION INTRAVENOUS at 18:28

## 2023-10-23 RX ADMIN — CARBOPROST TROMETHAMINE 250 MCG: 250 INJECTION, SOLUTION INTRAMUSCULAR at 19:07

## 2023-10-23 RX ADMIN — ONDANSETRON 4 MG: 2 INJECTION INTRAMUSCULAR; INTRAVENOUS at 18:15

## 2023-10-23 RX ADMIN — SODIUM CHLORIDE, POTASSIUM CHLORIDE, SODIUM LACTATE AND CALCIUM CHLORIDE 25 ML/HR: 600; 310; 30; 20 INJECTION, SOLUTION INTRAVENOUS at 14:11

## 2023-10-23 RX ADMIN — LIDOCAINE HYDROCHLORIDE,EPINEPHRINE BITARTRATE 5 ML: 20; .005 INJECTION, SOLUTION EPIDURAL; INFILTRATION; INTRACAUDAL; PERINEURAL at 19:33

## 2023-10-23 RX ADMIN — ONDANSETRON 4 MG: 2 INJECTION INTRAMUSCULAR; INTRAVENOUS at 19:25

## 2023-10-23 RX ADMIN — MAGNESIUM SULFATE HEPTAHYDRATE 2 G/HR: 40 INJECTION, SOLUTION INTRAVENOUS at 15:03

## 2023-10-23 RX ADMIN — NIFEDIPINE 30 MG: 30 TABLET, EXTENDED RELEASE ORAL at 22:45

## 2023-10-23 RX ADMIN — PHENYLEPHRINE HYDROCHLORIDE 150 MCG: 10 INJECTION INTRAVENOUS at 18:37

## 2023-10-23 RX ADMIN — ACETAMINOPHEN 975 MG: 325 TABLET ORAL at 17:25

## 2023-10-23 RX ADMIN — ACETAMINOPHEN 975 MG: 325 TABLET ORAL at 22:41

## 2023-10-23 RX ADMIN — PHENYLEPHRINE HYDROCHLORIDE 50 MCG: 10 INJECTION INTRAVENOUS at 18:39

## 2023-10-23 RX ADMIN — SENNOSIDES AND DOCUSATE SODIUM 1 TABLET: 8.6; 5 TABLET ORAL at 22:43

## 2023-10-23 RX ADMIN — SODIUM CHLORIDE, POTASSIUM CHLORIDE, SODIUM LACTATE AND CALCIUM CHLORIDE: 600; 310; 30; 20 INJECTION, SOLUTION INTRAVENOUS at 17:41

## 2023-10-23 RX ADMIN — MAGNESIUM SULFATE IN WATER 2 G: 40 INJECTION, SOLUTION INTRAVENOUS at 14:48

## 2023-10-23 RX ADMIN — Medication 3 G: at 18:20

## 2023-10-23 RX ADMIN — MAGNESIUM SULFATE HEPTAHYDRATE 4 G: 80 INJECTION, SOLUTION INTRAVENOUS at 14:22

## 2023-10-23 RX ADMIN — LOPERAMIDE HYDROCHLORIDE 4 MG: 2 CAPSULE ORAL at 20:56

## 2023-10-23 RX ADMIN — BUPIVACAINE HYDROCHLORIDE IN DEXTROSE 1.6 ML: 7.5 INJECTION, SOLUTION SUBARACHNOID at 18:24

## 2023-10-23 RX ADMIN — FENTANYL CITRATE 25 MCG: 50 INJECTION INTRAMUSCULAR; INTRAVENOUS at 18:24

## 2023-10-23 RX ADMIN — SODIUM CHLORIDE, POTASSIUM CHLORIDE, SODIUM LACTATE AND CALCIUM CHLORIDE 500 ML: 600; 310; 30; 20 INJECTION, SOLUTION INTRAVENOUS at 17:22

## 2023-10-23 RX ADMIN — SODIUM CHLORIDE, POTASSIUM CHLORIDE, SODIUM LACTATE AND CALCIUM CHLORIDE: 600; 310; 30; 20 INJECTION, SOLUTION INTRAVENOUS at 19:06

## 2023-10-23 RX ADMIN — LABETALOL HYDROCHLORIDE 400 MG: 200 TABLET, FILM COATED ORAL at 22:43

## 2023-10-23 RX ADMIN — Medication 0.15 MG: at 18:24

## 2023-10-23 RX ADMIN — Medication 300 ML/HR: at 19:04

## 2023-10-23 RX ADMIN — OXYCODONE HYDROCHLORIDE 5 MG: 5 TABLET ORAL at 22:41

## 2023-10-23 RX ADMIN — LABETALOL HYDROCHLORIDE 20 MG: 5 INJECTION, SOLUTION INTRAVENOUS at 14:16

## 2023-10-23 RX ADMIN — KETOROLAC TROMETHAMINE 30 MG: 30 INJECTION, SOLUTION INTRAMUSCULAR at 20:28

## 2023-10-23 RX ADMIN — SODIUM CITRATE AND CITRIC ACID MONOHYDRATE 30 ML: 500; 334 SOLUTION ORAL at 17:25

## 2023-10-23 ASSESSMENT — ACTIVITIES OF DAILY LIVING (ADL)
ADLS_ACUITY_SCORE: 18
DIFFICULTY_EATING/SWALLOWING: NO
ADLS_ACUITY_SCORE: 35
DOING_ERRANDS_INDEPENDENTLY_DIFFICULTY: NO
HEARING_DIFFICULTY_OR_DEAF: NO
DIFFICULTY_COMMUNICATING: NO
ADLS_ACUITY_SCORE: 18
FALL_HISTORY_WITHIN_LAST_SIX_MONTHS: NO
ADLS_ACUITY_SCORE: 35
TOILETING_ISSUES: NO
ADLS_ACUITY_SCORE: 18
ADLS_ACUITY_SCORE: 18
CONCENTRATING,_REMEMBERING_OR_MAKING_DECISIONS_DIFFICULTY: NO
WALKING_OR_CLIMBING_STAIRS_DIFFICULTY: NO
WEAR_GLASSES_OR_BLIND: NO
CHANGE_IN_FUNCTIONAL_STATUS_SINCE_ONSET_OF_CURRENT_ILLNESS/INJURY: NO
DRESSING/BATHING_DIFFICULTY: NO
ADLS_ACUITY_SCORE: 35

## 2023-10-23 NOTE — ANESTHESIA PREPROCEDURE EVALUATION
Anesthesia Pre-Procedure Evaluation    Patient: Kiera Iraheta   MRN: [id number] : 1995        Procedure : Procedure(s):   SECTION          Past Medical History:   Diagnosis Date     Anxiety      Anxiety disorder      Depression      Hard to intubate      History of cardiac radiofrequency ablation      Hypertension     history of chronic and gestational hypertension     Infertility, female      PCOS (polycystic ovarian syndrome)      Prediabetes      Severe dysmenorrhea       Past Surgical History:   Procedure Laterality Date     C/SECTION, LOW TRANSVERSE      fetal intolerance after long induction     CERCLAGE CERVICAL N/A 2018    Procedure: BALDERAS CERCLAGE;  Surgeon: Chele Claros MD;  Location: [facility];  Service:       SECTION N/A 2017    Procedure:  SECTION;  Surgeon: Chele Claros MD;  Location: [facility] OR;  Service:       SECTION N/A 2019    Procedure:  SECTION, REPEAT;  Surgeon: Chele Claros MD;  Location: [facility] OR;  Service: Obstetrics      SECTION N/A 2020    Procedure:  SECTION;  Surgeon: Chele Claros MD;  Location: [facility] OR;  Service: Obstetrics      SECTION N/A 2022    Procedure: REPEAT  SECTION;  Surgeon: Chele Claros MD;  Location: [facility]     LAPAROSCOPY DIAGNOSTIC (GENERAL) N/A 2016    Procedure: LAPAROSCOPY WITH TUBAL DYE STUDY LAPAROTOMY RIGHT OVARIAN BIOPSY,LEFT OVARIAN BIOPSY FULGERATION OF ENDOMETRIOSOS;  Surgeon: Chele Claros MD;  Location: [facility];  Service:      NOSE SURGERY       OTHER SURGICAL HISTORY      wisdom teeth extractions      No Known Allergies   Social History     Tobacco Use     Smoking status: Never     Smokeless tobacco: Never   Substance Use Topics     Alcohol use: No     Comment: Alcoholic Drinks/day: rarely      Wt Readings  "from Last 1 Encounters:   10/23/23 145.4 kg (320 lb 9.6 oz)        Anesthesia Evaluation   Pt has had prior anesthetic.     History of anesthetic complications  - difficult airway.  Pt is unaware of having difficulty with intubation.    ROS/MED HX  ENT/Pulmonary:  - neg pulmonary ROS     Neurologic:  - neg neurologic ROS     Cardiovascular:     (+)  hypertension (PIH)- -   -  - -                                      METS/Exercise Tolerance: >4 METS    Hematologic:  - neg hematologic  ROS     Musculoskeletal:  - neg musculoskeletal ROS     GI/Hepatic:     (+) GERD,                   Renal/Genitourinary:  - neg Renal ROS     Endo:     (+)               Obesity (BMI 50),       Psychiatric/Substance Use:     (+) psychiatric history (ADHD) depression and anxiety       Infectious Disease:       Malignancy:       Other:            Physical Exam    Airway        Mallampati: II   TM distance: > 3 FB   Neck ROM: full   Mouth opening: > 3 cm    Respiratory Devices and Support         Dental         B=Bridge, C=Chipped, L=Loose, M=Missing    Cardiovascular   cardiovascular exam normal          Pulmonary   pulmonary exam normal            OUTSIDE LABS:  CBC:   Lab Results   Component Value Date    WBC 12.4 (H) 10/23/2023    WBC 10.6 10/23/2023    HGB 9.9 (L) 10/23/2023    HGB 11.5 (L) 10/23/2023    HCT 30.5 (L) 10/23/2023    HCT 35.0 10/23/2023     10/23/2023     10/23/2023     BMP:   Lab Results   Component Value Date     10/23/2023     (L) 09/05/2023    POTASSIUM 4.5 10/23/2023    POTASSIUM 3.8 09/05/2023    CHLORIDE 102 10/23/2023    CHLORIDE 103 09/05/2023    CO2 24 10/23/2023    CO2 22 09/05/2023    BUN 7.0 10/23/2023    BUN 7.3 09/05/2023    CR 0.52 10/23/2023    CR 0.53 09/05/2023    GLC 86 10/23/2023     (H) 09/05/2023     COAGS:   Lab Results   Component Value Date    PTT 24 07/27/2022    INR 0.99 07/27/2022     POC: No results found for: \"BGM\", \"HCG\", \"HCGS\"  HEPATIC:   Lab Results "   Component Value Date    ALBUMIN 3.5 10/23/2023    PROTTOTAL 7.2 10/23/2023    ALT 14 10/23/2023    AST 19 10/23/2023    ALKPHOS 178 (H) 10/23/2023    BILITOTAL 0.2 10/23/2023     OTHER:   Lab Results   Component Value Date    ROSY 9.8 10/23/2023       Anesthesia Plan    ASA Status:  3    NPO Status:  NPO Appropriate    Anesthesia Type: CSE.              Consents    Anesthesia Plan(s) and associated risks, benefits, and realistic alternatives discussed. Questions answered and patient/representative(s) expressed understanding.     - Discussed: Risks, Benefits and Alternatives for BOTH SEDATION and the PROCEDURE were discussed     - Discussed with:  Patient      - Extended Intubation/Ventilatory Support Discussed: No.      - Patient is DNR/DNI Status: No     Use of blood products discussed: Yes.     - Discussed with: Patient.     - Consented: consented to blood products            Reason for refusal: other.     Postoperative Care    Pain management: Peripheral nerve block (Single Shot), intrathecal morphine.     - Plan for long acting post-op opioid use   PONV prophylaxis: Ondansetron (or other 5HT-3)     Comments:    Other Comments: Possible difficult airway    Combined spinal epidural           Dago Clay MD

## 2023-10-23 NOTE — PLAN OF CARE
Problem: Hypertensive Disorders in Pregnancy  Goal: Patient-Fetal Stabilization  Outcome: Progressing   Goal Outcome Evaluation:      Plan of Care Reviewed With: patient    Overall Patient Progress: improvingOverall Patient Progress: improving    Outcome Evaluation: treatment for severe range blood pressure and initiated magnesium sulfate    Provider in department and informed of severe range blood pressures, lab results; admit order received and patient transferred [facility]. BP treated with IV labetalol and 6 gm magnesium load given then initiated magnesium gtt at 2gm/hr. Vital signs and assessment as ordered. Kiera continues complaints of low grade headache and visual disturbances that are both unchanged since arrival-provider aware. Plan for to follow repeat c/s this evening.

## 2023-10-23 NOTE — PROGRESS NOTES
Serial blood pressure results since 1152 reported to Dr eLi with severe range BP at 1300 of 160/89. Plan to continue every 15 minute serial blood pressures for next hour or until provider at bedside. Reviewed lab results. Dr Lei will evaluate Kiera in person in next hour. Continue with NPO status except sips of water.

## 2023-10-23 NOTE — ANESTHESIA PROCEDURE NOTES
"Intrathecal injection Procedure Note    Pre-Procedure   Staff -        Anesthesiologist:  Vale Barton MD       Performed By: anesthesiologist       Location: OR       Procedure Start/Stop Times: 10/23/2023 6:19 PM and 10/23/2023 6:24 PM       Pre-Anesthestic Checklist: patient identified, IV checked, risks and benefits discussed, informed consent, monitors and equipment checked, pre-op evaluation, at physician/surgeon's request and post-op pain management  Timeout:       Correct Patient: Yes        Correct Procedure: Yes        Correct Site: Yes        Correct Position: Yes   Procedure Documentation  Procedure: intrathecal injection       Diagnosis: Repeat C/S       Patient Position: sitting       Skin prep: Chloraprep       Insertion Site: L3-4. (midline approach).       Needle Gauge: 25.        Needle Length (Inches): 5        Spinal Needle Type: Rachel tip       Introducer used (19 G epidural needle)       # of attempts: 1 and  # of redirects:  0    Assessment/Narrative         Paresthesias: No.       Sensory Level: T10       CSF fluid: clear.    Medication(s) Administered   0.75% Hyperbaric Bupivacaine (Intrathecal) - Intrathecal   1.6 mL - 10/23/2023 6:24:00 PM  Fentanyl PF (Intrathecal) - Intrathecal   25 mcg - 10/23/2023 6:24:00 PM  Morphine PF 1 mg/mL (Intrathecal) - Intrathecal   0.15 mg - 10/23/2023 6:24:00 PM  Medication Administration Time: 10/23/2023 6:19 PM     Comments:  Facile placement, single attempt, single pass.  Patient tolerated well and without complication.  Epidural catheter threaded easily and without complication.  20 g catheter.  Negative aspiration.    Vale Barton MD [id number]      FOR [facility] ([facility]) ONLY:   Pain Team Contact information: please page the Pain Team Via [employer]. Search \"Pain\". During daytime hours, please page the attending first. At night please page the resident first.      "

## 2023-10-23 NOTE — H&P
10/23/23   Cheyenne Perez   1995    History&Physical     HPI: 27 year old  at 35w5d presents with headache and vision changes. She has cHTN which has been maintained on labetalol 400mg BID and procardiaXL 30mg BID; which the patient took appropriate doses this morning. She has had HA since yesterday, and this is not relieved with tylenol. She has had vision changes with black spots in her vision since 10/20 late in the day. While in OB triage, she developed severe range Bps, which were treated with labetalol 20mg IV. Feeling fetal movement. Denies leaking of fluid. Denies vaginal bleeding. Pregnancy complicated by h/o CS x4, anxiety and depression, ADHD and obesity.    OB History: reviewed  OB History    Para Term  AB Living   9 4 2 2 4 6   SAB IAB Ectopic Multiple Live Births   4 0 0 1 6      # Outcome Date GA Lbr Carlos/2nd Weight Sex Delivery Anes PTL Lv   9 Current            8  22 36w2d  3.37 kg (7 lb 6.9 oz) F CS-LTranv Spinal N STALIN      Name: ANAFEMALE-CHEYENNE      Apgar1: 7  Apgar5: 8   7 Term 20 37w3d  3.47 kg (7 lb 10.4 oz) M CS-LTranv EPI, Nitrous N STALIN      Complications: Failure to Progress in First Stage, Failure to Progress in Second Stage      Name: FRANCISCA PEREZ-CHEYENNE      Apgar1: 9  Apgar5: 9   6 SAB 10/2019     SAB      5A  19 33w1d  2.11 kg (4 lb 10.4 oz) F CS-LTranv Spinal  STALIN      Name: ANAFEMALE-A [place]NE      Apgar1: 6  Apgar5: 8   5B  19 33w1d  2.39 kg (5 lb 4.3 oz) F CS-LTranv Spinal  STALIN      Name: ANAFEMALE-B [place]NE      Apgar1: 5  Apgar5: 6   5C  19 33w1d  2.34 kg (5 lb 2.5 oz) M CS-LTranv Spinal  STALIN      Name: ANAMALE-C [place]NE      Apgar1: 6  Apgar5: 7   4 SAB 2017     SAB      3 Term 08/14/17 37w2d  2.78 kg (6 lb 2.1 oz) F CS-LTranv Spinal, EPI N STALIN      Name: ANA,FEMALE-CHEYENNE      Apgar1: 8  Apgar5: 9   2 SAB            1 SAB                GYN  History: denies history of STDs or pelvic infections; denies history of abnormal PAP smears    Medical History: reviewed  Past Medical History:   Diagnosis Date    Anxiety     Anxiety disorder     Depression     Hard to intubate     History of cardiac radiofrequency ablation     Hypertension     history of chronic and gestational hypertension    Infertility, female     PCOS (polycystic ovarian syndrome)     Prediabetes     Severe dysmenorrhea        Surgical History: reviewed  Past Surgical History:   Procedure Laterality Date    C/SECTION, LOW TRANSVERSE      fetal intolerance after long induction    CERCLAGE CERVICAL N/A 2018    Procedure: BALDERAS CERCLAGE;  Surgeon: Chele Claros MD;  [place] [facility] [place]:      SECTION N/A 2017    Procedure:  SECTION;  Surgeon: Chele Claros MD;  Location: [facility] OR;  Service:      SECTION N/A 2019    Procedure:  SECTION, REPEAT;  Surgeon: Chele Claros MD;  Location: [facility] OR;  Service: Obstetrics     SECTION N/A 2020    Procedure:  SECTION;  Surgeon: Chele Claros MD;  Location: [facility] OR;  Service: Obstetrics     SECTION N/A 2022    Procedure: REPEAT  SECTION;  Surgeon: Chele Claros MD;  Location: [facility]    LAPAROSCOPY DIAGNOSTIC (GENERAL) N/A 2016    Procedure: LAPAROSCOPY WITH TUBAL DYE STUDY LAPAROTOMY RIGHT OVARIAN BIOPSY,LEFT OVARIAN BIOPSY FULGERATION OF ENDOMETRIOSOS;  Surgeon: Chele Claros MD;  [place] [facility] [place]  Service:     NOSE SURGERY      OTHER SURGICAL HISTORY      wisdom teeth extractions       Family History: denies history of bleeding or clotting disorders     Social History:  denies use of tobacco, drugs, or alcohol    Medications:   Labetalol 400mg BID  ProcardiaXL 30mg BID  Celexa 40 mg daily  Seroquel 25 mg daily PRN  H/o  "Adderall 30 mg daily - self weaned at 30 weeks  bASA  PNV    Allergies: reviewed   No Known Allergies    Vital signs:  Temp: 98.1  F (36.7  C) Temp src: Oral BP: (!) 143/74     Resp: 18            Estimated body mass index is 50.12 kg/m  as calculated from the following:    Height as of 23: 1.702 m (5' 7\").    Weight as of 23: 145.2 kg (320 lb).    Physical Exam:  General:  no distress  Abdomen: gravid, nontender  Extremities: no calf pain, minimal symmetric edema in legs    FHRT: 135bpm, + accelerations, no decelarations, mod variability  TOCO: irregular    Labs: all recent labs reviewed    Assessment/Plan: 27 year old  at 35w5d with pre-eclampsia with severe features  cHTN with superimposed pre-eclampsia with severe features  - h/o cHTN     - severe by neuro symptoms and need for acute antihypertensives in the setting of cHTN              - symptomatic with HA and vision changes              - BPs 140s-170s/60s-100s   - acute antihypertensives: labetalol 20mg IV at 1416 on 10/23              - maintenance anti-hypertensives                          --labetalol 400mg q12h                          --procardiaXL 30mg q12h              - pre-e labs normal 10/23              - magnesium sulfate 6g load (for BMI >35) with 2g/h to follow starting now and for 24h after delivery   - monitor Bps, I/Os, and symptoms closely   - plan delivery today by [facility]  Fetal Status  - NST reactive  - continuous monitoring   - US in office today 10/23: BPP 8/8, TRISHA 9.63cm, cephalic, fundal placenta  - US 10/9 at 33w5d EFW 2944g, 98%tile  3. H/o CS x4  - fundal placenta  - reviewed increased risk of scar tissue and injury to other organs with multiple CS  4. Anxiety and depression  -currently managed on Celexa 40 mg daily, and Seroquel 25 mg daily PRN  -history severe postpartum mood issues with psychosis after last delivery  5. ADHD  - was on Adderall 30 mg daily - self weaned at 30 weeks  6.Obesity  - pre-pregnancy BMI " >40  - Fetal Echo normal 23  - SCDs while in bed  7. Prenatal labs   - GBS not done, planned CS   - Blood Type: B+  - Antibody screen: negative   - RPR NR, HepBsAg NR, Rubella immune, HIV NR, HepCAb NR   - passed 1h GTT    We discussed the risks of  section including bleeding (the patient consents to blood transfusion if needed), infection, injury to fetus, and injury to surrounding organs especially as she has had multiple CS. The patient expressed understanding and desires to proceed with  section. T&S pending and CBC complete.    Discussed plan of care with patient, and the patient expressed understanding. Opportunity for questions given, and all questions were answered.    Staci Lei MD

## 2023-10-23 NOTE — PROGRESS NOTES
"Data: Patient presented to [facility]: 10/23/2023 11:03 AM.  Reason for maternal/fetal assessment is hypertension disorder of pregnancy. Patient reports \"webby floaters\" in her vision and mild anterior headache. Patient denies leaking of fluids, vaginal bleeding, right upper quadrant pain. Patient reports fetal movement is normal. Patient is a 35w5d . Pregnancy has been complicated by hypertension. Additional risk factors include her  status, hx of cesareans, elevated BMI.    Serial blood pressures initiated. Afebrile. Support person is not present.     Action: Verbal consent for EFM. Triage assessment completed.     Response: Patient verbalized agreement with plan. Contacted MD Lei and notified her of patient arrival, uterine activity, elevated pressures, reactive NST. Orders received to collect protein urine and lab draw.      "

## 2023-10-24 LAB
ALBUMIN SERPL BCG-MCNC: 2.9 G/DL (ref 3.5–5.2)
ALP SERPL-CCNC: 168 U/L (ref 35–104)
ALT SERPL W P-5'-P-CCNC: 14 U/L (ref 0–50)
ANION GAP SERPL CALCULATED.3IONS-SCNC: 9 MMOL/L (ref 7–15)
AST SERPL W P-5'-P-CCNC: 28 U/L (ref 0–45)
BILIRUB SERPL-MCNC: 0.3 MG/DL
BUN SERPL-MCNC: 8.8 MG/DL (ref 6–20)
CALCIUM SERPL-MCNC: 7.8 MG/DL (ref 8.6–10)
CHLORIDE SERPL-SCNC: 98 MMOL/L (ref 98–107)
CREAT SERPL-MCNC: 0.62 MG/DL (ref 0.51–0.95)
DEPRECATED HCO3 PLAS-SCNC: 25 MMOL/L (ref 22–29)
EGFRCR SERPLBLD CKD-EPI 2021: >90 ML/MIN/1.73M2
ERYTHROCYTE [DISTWIDTH] IN BLOOD BY AUTOMATED COUNT: 12.9 % (ref 10–15)
GLUCOSE SERPL-MCNC: 97 MG/DL (ref 70–99)
HCT VFR BLD AUTO: 29.2 % (ref 35–47)
HGB BLD-MCNC: 9.6 G/DL (ref 11.7–15.7)
MCH RBC QN AUTO: 27.2 PG (ref 26.5–33)
MCHC RBC AUTO-ENTMCNC: 32.9 G/DL (ref 31.5–36.5)
MCV RBC AUTO: 83 FL (ref 78–100)
PLATELET # BLD AUTO: 329 10E3/UL (ref 150–450)
POTASSIUM SERPL-SCNC: 4.9 MMOL/L (ref 3.4–5.3)
PROT SERPL-MCNC: 6.2 G/DL (ref 6.4–8.3)
RBC # BLD AUTO: 3.53 10E6/UL (ref 3.8–5.2)
SODIUM SERPL-SCNC: 132 MMOL/L (ref 135–145)
T PALLIDUM AB SER QL: NONREACTIVE
WBC # BLD AUTO: 9.6 10E3/UL (ref 4–11)

## 2023-10-24 PROCEDURE — 250N000011 HC RX IP 250 OP 636: Mod: JZ | Performed by: OBSTETRICS & GYNECOLOGY

## 2023-10-24 PROCEDURE — 120N000001 HC R&B MED SURG/OB

## 2023-10-24 PROCEDURE — 85027 COMPLETE CBC AUTOMATED: CPT | Performed by: OBSTETRICS & GYNECOLOGY

## 2023-10-24 PROCEDURE — 250N000013 HC RX MED GY IP 250 OP 250 PS 637: Performed by: OBSTETRICS & GYNECOLOGY

## 2023-10-24 PROCEDURE — 250N000011 HC RX IP 250 OP 636: Mod: JZ | Performed by: INTERNAL MEDICINE

## 2023-10-24 PROCEDURE — 80053 COMPREHEN METABOLIC PANEL: CPT | Performed by: OBSTETRICS & GYNECOLOGY

## 2023-10-24 PROCEDURE — 258N000003 HC RX IP 258 OP 636: Performed by: OBSTETRICS & GYNECOLOGY

## 2023-10-24 PROCEDURE — 36415 COLL VENOUS BLD VENIPUNCTURE: CPT | Performed by: OBSTETRICS & GYNECOLOGY

## 2023-10-24 RX ORDER — HYDROMORPHONE HYDROCHLORIDE 1 MG/ML
0.2 INJECTION, SOLUTION INTRAMUSCULAR; INTRAVENOUS; SUBCUTANEOUS EVERY 5 MIN PRN
Status: DISCONTINUED | OUTPATIENT
Start: 2023-10-24 | End: 2023-10-26 | Stop reason: HOSPADM

## 2023-10-24 RX ORDER — FENTANYL CITRATE 50 UG/ML
25 INJECTION, SOLUTION INTRAMUSCULAR; INTRAVENOUS EVERY 5 MIN PRN
Status: DISCONTINUED | OUTPATIENT
Start: 2023-10-24 | End: 2023-10-26 | Stop reason: HOSPADM

## 2023-10-24 RX ORDER — FENTANYL CITRATE 50 UG/ML
50 INJECTION, SOLUTION INTRAMUSCULAR; INTRAVENOUS EVERY 5 MIN PRN
Status: DISCONTINUED | OUTPATIENT
Start: 2023-10-24 | End: 2023-10-26 | Stop reason: HOSPADM

## 2023-10-24 RX ORDER — HYDROMORPHONE HYDROCHLORIDE 1 MG/ML
0.4 INJECTION, SOLUTION INTRAMUSCULAR; INTRAVENOUS; SUBCUTANEOUS EVERY 5 MIN PRN
Status: DISCONTINUED | OUTPATIENT
Start: 2023-10-24 | End: 2023-10-26 | Stop reason: HOSPADM

## 2023-10-24 RX ORDER — ONDANSETRON 4 MG/1
4 TABLET, ORALLY DISINTEGRATING ORAL EVERY 30 MIN PRN
Status: DISCONTINUED | OUTPATIENT
Start: 2023-10-24 | End: 2023-10-26 | Stop reason: HOSPADM

## 2023-10-24 RX ORDER — ENOXAPARIN SODIUM 100 MG/ML
40 INJECTION SUBCUTANEOUS EVERY 24 HOURS
Status: DISCONTINUED | OUTPATIENT
Start: 2023-10-24 | End: 2023-10-26 | Stop reason: HOSPADM

## 2023-10-24 RX ORDER — ONDANSETRON 2 MG/ML
4 INJECTION INTRAMUSCULAR; INTRAVENOUS EVERY 30 MIN PRN
Status: DISCONTINUED | OUTPATIENT
Start: 2023-10-24 | End: 2023-10-26 | Stop reason: HOSPADM

## 2023-10-24 RX ORDER — SODIUM CHLORIDE, SODIUM LACTATE, POTASSIUM CHLORIDE, CALCIUM CHLORIDE 600; 310; 30; 20 MG/100ML; MG/100ML; MG/100ML; MG/100ML
INJECTION, SOLUTION INTRAVENOUS CONTINUOUS
Status: DISCONTINUED | OUTPATIENT
Start: 2023-10-24 | End: 2023-10-26 | Stop reason: HOSPADM

## 2023-10-24 RX ADMIN — LABETALOL HYDROCHLORIDE 400 MG: 200 TABLET, FILM COATED ORAL at 22:20

## 2023-10-24 RX ADMIN — SENNOSIDES AND DOCUSATE SODIUM 1 TABLET: 8.6; 5 TABLET ORAL at 22:19

## 2023-10-24 RX ADMIN — SENNOSIDES AND DOCUSATE SODIUM 1 TABLET: 8.6; 5 TABLET ORAL at 09:28

## 2023-10-24 RX ADMIN — NIFEDIPINE 30 MG: 30 TABLET, EXTENDED RELEASE ORAL at 22:19

## 2023-10-24 RX ADMIN — OXYCODONE HYDROCHLORIDE 5 MG: 5 TABLET ORAL at 19:07

## 2023-10-24 RX ADMIN — KETOROLAC TROMETHAMINE 30 MG: 30 INJECTION, SOLUTION INTRAMUSCULAR; INTRAVENOUS at 07:55

## 2023-10-24 RX ADMIN — OXYCODONE HYDROCHLORIDE 5 MG: 5 TABLET ORAL at 15:07

## 2023-10-24 RX ADMIN — SODIUM CHLORIDE, POTASSIUM CHLORIDE, SODIUM LACTATE AND CALCIUM CHLORIDE 75 ML/HR: 600; 310; 30; 20 INJECTION, SOLUTION INTRAVENOUS at 10:37

## 2023-10-24 RX ADMIN — ACETAMINOPHEN 975 MG: 325 TABLET ORAL at 18:28

## 2023-10-24 RX ADMIN — CITALOPRAM HYDROBROMIDE 40 MG: 40 TABLET ORAL at 09:32

## 2023-10-24 RX ADMIN — ACETAMINOPHEN 975 MG: 325 TABLET ORAL at 05:21

## 2023-10-24 RX ADMIN — OXYCODONE HYDROCHLORIDE 5 MG: 5 TABLET ORAL at 02:08

## 2023-10-24 RX ADMIN — MAGNESIUM SULFATE HEPTAHYDRATE 2 G/HR: 40 INJECTION, SOLUTION INTRAVENOUS at 12:21

## 2023-10-24 RX ADMIN — NIFEDIPINE 30 MG: 30 TABLET, EXTENDED RELEASE ORAL at 08:13

## 2023-10-24 RX ADMIN — ENOXAPARIN SODIUM 40 MG: 40 INJECTION SUBCUTANEOUS at 22:23

## 2023-10-24 RX ADMIN — ACETAMINOPHEN 975 MG: 325 TABLET ORAL at 09:28

## 2023-10-24 RX ADMIN — OXYCODONE HYDROCHLORIDE 5 MG: 5 TABLET ORAL at 07:52

## 2023-10-24 RX ADMIN — KETOROLAC TROMETHAMINE 30 MG: 30 INJECTION, SOLUTION INTRAMUSCULAR; INTRAVENOUS at 15:07

## 2023-10-24 RX ADMIN — KETOROLAC TROMETHAMINE 30 MG: 30 INJECTION, SOLUTION INTRAMUSCULAR; INTRAVENOUS at 02:09

## 2023-10-24 RX ADMIN — LABETALOL HYDROCHLORIDE 400 MG: 200 TABLET, FILM COATED ORAL at 08:11

## 2023-10-24 RX ADMIN — IBUPROFEN 800 MG: 800 TABLET ORAL at 22:29

## 2023-10-24 ASSESSMENT — ACTIVITIES OF DAILY LIVING (ADL)
ADLS_ACUITY_SCORE: 19
ADLS_ACUITY_SCORE: 19
ADLS_ACUITY_SCORE: 21
ADLS_ACUITY_SCORE: 19
ADLS_ACUITY_SCORE: 18
ADLS_ACUITY_SCORE: 19
ADLS_ACUITY_SCORE: 19
ADLS_ACUITY_SCORE: 18
ADLS_ACUITY_SCORE: 21

## 2023-10-24 NOTE — PLAN OF CARE
Problem: Adult Inpatient Plan of Care  Goal: Plan of Care Review  Description: The Plan of Care Review/Shift note should be completed every shift.  The Outcome Evaluation is a brief statement about your assessment that the patient is improving, declining, or no change.  This information will be displayed automatically on your shift  note.  Outcome: Progressing  Flowsheets (Taken 10/24/2023 0607)  Plan of Care Reviewed With:   patient   spouse     Problem: Hypertensive Disorders in Pregnancy  Goal: Patient-Fetal Stabilization  Outcome: Progressing     Problem: Postpartum ( Delivery)  Goal: Successful Parent Role Transition  Outcome: Progressing  Intervention: Support Parent Role Transition  Recent Flowsheet Documentation  Taken 10/23/2023 2333 by Steph Heredia RN  Supportive Measures:   active listening utilized   positive reinforcement provided  Goal: Absence of Infection Signs and Symptoms  Outcome: Progressing  Goal: Optimal Pain Control and Function  Outcome: Progressing  Intervention: Prevent or Manage Pain  Recent Flowsheet Documentation  Taken 10/23/2023 2333 by Steph Heredia RN  Perineal Care:   perineal hygiene encouraged   perineum cleansed     Problem: Breastfeeding  Goal: Effective Breastfeeding  Outcome: Progressing  Intervention: Support Exclusive Breastfeeding Success  Recent Flowsheet Documentation  Taken 10/23/2023 2333 by Steph Heredia RN  Supportive Measures:   active listening utilized   positive reinforcement provided     Problem: Postpartum ( Delivery)  Goal: Anesthesia/Sedation Recovery  Outcome: Met     Problem: Adult Inpatient Plan of Care  Goal: Absence of Hospital-Acquired Illness or Injury  Intervention: Prevent and Manage VTE (Venous Thromboembolism) Risk  Recent Flowsheet Documentation  Taken 10/24/2023 0515 by Steph Heredia RN  VTE Prevention/Management:   SCDs (sequential compression devices) off   patient refused intervention  Taken 10/24/2023  0110 by Stpeh Heredia RN  VTE Prevention/Management:   SCDs (sequential compression devices) off   patient refused intervention  Taken 10/23/2023 2333 by Steph Heredia RN  VTE Prevention/Management:   SCDs (sequential compression devices) off   patient refused intervention  Intervention: Prevent Infection  Recent Flowsheet Documentation  Taken 10/24/2023 0215 by Steph Heredia RN  Infection Prevention:   hand hygiene promoted   rest/sleep promoted  Goal: Optimal Comfort and Wellbeing  Intervention: Provide Person-Centered Care  Recent Flowsheet Documentation  Taken 10/24/2023 0215 by Steph Heredia RN  Trust Relationship/Rapport:   care explained   questions answered   Goal Outcome Evaluation:      Plan of Care Reviewed With: patient, spouse

## 2023-10-24 NOTE — CONSULTS
NOTE COPIED FROM 'S CHART:    INITIAL SOCIAL WORK NICU ASSESSMENT      DATA:    SW met with pts mom, Kiera, in her postpartum room.     Reason for Social Work Consult: NICU admission.     Living Situation: Kiera reports living with her , Genaro, and their six older children (ages 6 and under).      Social Support: Kiera reports having a very strong support system including her parents and brother who live close by. She reports having 14 siblings and notes a close and supportive family system.     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 She reports that Genaro works full-time for the [employer] and will get six weeks of paternity leave.      Insurance: [employer] [place] [employer], Medicaid. Kiera reports plan to add the pt to this insurance.      Source of Financial Support: Employment. Kiera reports that she does receive WIC. She denies major financial concerns though notes that she is open to any additional help that might be available.      Mental Health History: Kiera endorses a history of anxiety and depression for which she takes medication. She reports that she felt fairly well during her pregnancy from a mental health standpoint and attributes this to having her mental health managed currently. She reports that she and her providers have a plan for her mental health management during pregnancy and also the postpartum period. Kiera does endorse a history of postpartum mood issues. She reports that she has a therapist that she has not seen for awhile but can see again as needed.     Chemical Health History: Chart reviewed. No tox screen sent on pt or Kiera.     INTERVENTION:      PATRICIA completed chart review and collaborated with the multidisciplinary team.   Psychosocial Assessment   Introduction to NICU  role and scope of practice   Discussed NICU experience and gave NICU welcome card  Reviewed Hospital and Community Resources   Assessed  "Chemical Health History and Current Symptoms   Assessed Mental Health History and Current Symptoms   Identified stressors, barriers and family concerns   Provided support and active empathetic listening and validation.   Provided psychoeducation on  mood and anxiety disorders, assessed for any current symptoms or history     ASSESSMENT:      Coping: Kiera appears to be coping well with pts NICU admission. She reports that she has been able to visit and is looking forward to getting to be with the pt more once her own IV medications are no longer needed. She reports that she is happy that the pt is getting needed care currently. She reports that they do have previous experience with the NICU and she feels good about how she is managing it so far. Kiera notes that they are anticipating the pt might be able to transfer out of NICU even tomorrow if he does well. SW provided supportive listening and encouragement. SW encouraged Kiera to be gentle with herself during this time.      Affect: Calm, appropriate     Mood: \"Good\"     Motivation/Ability to Access Services: Kiera appears able to access services as needed. Sw discussed possibility of applying for [facility] financial benefits for additional financial support. SW provided parent resources for her to use as needed which includes information on applying for financial benefits. SW will continue to follow and assess for needs throughout NICU stay.     Assessment of Support System:  Very strong     Level of engagement with SW: High     Family's understanding of baby's medical situation:  Strong     Family and parent/infant interactions: Not assessed at this time.     Assessment of parental risk for PMAD: Moderate given Kiera's history of postpartum mood issues and pts NICU admission. This increased risk was discussed with Kiera and brief education on postpartum mood concerns provided. SW encouraged Kiera to follow the plan that was established " with her providers regarding her mental health in the postpartum period and to stay in close contact with her providers during that time. PATRICIA also encouraged Kiera to have her /family assist in monitoring how she is doing and in reaching out for help as needed. Kiera reports understanding of these recommendations. She reports that her  is very good about noticing changes and helping her reach out.     Strengths: experienced parents, strong support system, established mental health providers and plan for mental health in the postpartum period, stable employment     Vulnerabilities:  NICU admission, history of postpartum mood issues     Identified Barriers: None at this time     PLAN:    PATRICIA provided Kiera with SW NICU Welcome information and discussed plan for SW to follow and remain available during pts NICU stay for any further needs. Kiera reports understanding and denies any SW needs or questions at this time.     RICHI Swift on 10/24/2023 at 1:47 PM

## 2023-10-24 NOTE — PROGRESS NOTES
Postop/postpartum Rounding Note    10/24/23  Kiera Rojasand  1995    Subjective: Patient doing well. Anxious about baby in the NICU. Pain is well-controlled. She is tolerating general diet, voiding urine without difficulty, and ambulating without lightheadedness. Vaginal bleeding is within normal limits. Denies headache, blurred vision, scotoma, chest pain, dyspnea, nausea, vomiting, RUQ pain, or new onset edema of extremities.     Vital signs:  Vitals:    10/24/23 0515 10/24/23 0527 10/24/23 0529 10/24/23 0811   BP: 124/72   (!) 129/93   BP Location:       Patient Position:       Cuff Size:       Pulse:    84   Resp: 18      Temp:       TempSrc:       SpO2:  91% 98%    Weight:       Height:           Physical Exam:  General: Alert, no distress  Heart: Acyanotic  Lungs: non-labored respirations  Abdomen: Non-distended, non-tender, uterine fundus firm and below umbilicus; incision clean/dry/intact  Extremities: non-edematous, non-erythematous, no calf pain bilaterally      Intake/Output Summary (Last 24 hours) at 10/24/2023 0849  Last data filed at 10/24/2023 0600  Gross per 24 hour   Intake 4275 ml   Output 2531 ml   Net 1744 ml     UOP: 1150 mL in last 8 hours overnight = 0.99 mL/kg/hr    Recent Labs   Lab Test 10/24/23  0749 10/23/23  1406 10/23/23  1155 09/05/23  1743 07/28/23  2024 07/28/22  0747 07/27/22  0917 07/26/22  1939 12/10/20  0621 12/09/20  0131 12/07/20  0110   WBC 9.6 12.4* 10.6 10.8 10.4  --   --  13.7* 10.2   < > 11.0   HGB 9.6* 9.9* 11.5* 10.9* 12.3 10.5*  --  11.4* 9.8*   < > 11.4*   HCT 29.2* 30.5* 35.0 33.1* 35.2  --   --  34.8* 31.0*   < > 35.1    412 366 343 327  --  408 355 295   < > 363   *  --  136 135* 135*  --   --  137  --   --   --    AST 28  --  19 17 25  --   --  15 21  --  17   ALT 14  --  14 9 17  --   --  13 14  --  14    < > = values in this interval not displayed.         Assessment/Plan: 27 year old POD#1 s/p rLTCS on 10/23/23  1. Postop/postpartum    -  Afebrile, VSS   - Pain well-controlled with Tylenol, Toradol, and PRN oxycodone   - Tolerating general diet   - UOP adequate, voiding spontaneously   - DVT ppx: SCDs while in bed  - Continue postpartum cares  2. cHTN with superimposed pre-eclampsia with severe features  - h/o cHTN              - severe by neuro symptoms and need for acute antihypertensives in the setting of cHTN              - symptomatic with HA and vision changes on admission, now asymptomatic              - BPs 100s-120s/60s-90s this AM              - acute antihypertensives: labetalol 20mg IV at 1416 on 10/23              - maintenance anti-hypertensives                          --labetalol 400mg q12h                          --procardiaXL 30mg q12h              - pre-e labs normal 10/23              - magnesium sulfate 6g load (for BMI >35) with 2g/h following, continuing for 24h after delivery (stop at 1900 tonight)               --monitor Bps, I/Os, and symptoms closely    --no signs of Mg toxicity    --UOP adequate  3. Anxiety and depression  - currently managed on Celexa 40 mg daily, and Seroquel 25 mg daily PRN  - history severe postpartum mood issues with psychosis after last delivery  - monitor mood sx closely  5. ADHD  - was on Adderall 30 mg daily - self weaned at 30 weeks  6. Obesity  - pre-pregnancy BMI >40, BMI 50.2  - Fetal Echo normal 7/7/23  - DVT ppx: SCDs while in bed, lovenox 40mg every day (start tonight at 2000)      Discussed plan of care with patient, and patient expressed understanding. Opportunity for questions given, and all questions were answered.    Disposition: plan for discharge at least 24 hours of normotension after stopping magnesium sulfate, likely 10/27/23. Follow up in the office in 1 week.      Kathy Frausto MD

## 2023-10-24 NOTE — ANESTHESIA CARE TRANSFER NOTE
Patient: Kiera Iraheta    Procedure: Procedure(s):   SECTION       Diagnosis: Pre-eclampsia in third trimester [O14.93]  Diagnosis Additional Information: No value filed.    Anesthesia Type:   CSE     Note:    Oropharynx: oropharynx clear of all foreign objects and spontaneously breathing  Level of Consciousness: awake  Oxygen Supplementation: room air    Independent Airway: airway patency satisfactory and stable  Dentition: dentition unchanged  Vital Signs Stable: post-procedure vital signs reviewed and stable  Report to RN Given: handoff report given  Patient transferred to: Labor and Delivery    Handoff Report: Identifed the Patient, Identified the Reponsible Provider, Reviewed the pertinent medical history, Discussed the surgical course, Reviewed Intra-OP anesthesia mangement and issues during anesthesia, Set expectations for post-procedure period and Allowed opportunity for questions and acknowledgement of understanding      Vitals:  Vitals Value Taken Time   /77 10/23/23 2043   Temp 36.7  C (98  F) 10/23/23 2040   Pulse 114 10/23/23 2040   Resp 16 10/23/23 2040   SpO2 99 % 10/23/23 2042   Vitals shown include unfiled device data.    Electronically Signed By: ALIREZA Grewal CRNA  2023  8:46 PM

## 2023-10-24 NOTE — PROGRESS NOTES
Dr Lei called to get order for W/C ride to NICU for short period to visit infant.  0000 Discuss pt remaining in W/C during visit. Pt transferred to recliner in NICU room by FOB.   0015 Asked Pt to return to room for assessment. Refused.

## 2023-10-24 NOTE — OP NOTE
Section Operative Note   10/23/23     Patient: Kiera Iraheta 1995     Procedure: Procedure(s):   SECTION     Surgeon(s): Staci Lei MD    Pre-operative Diagnosis:   1. 27 year old  with IUP @ 35w5d  2. cHTN with superimposed pre-eclampsia with severe features  3. H/o CS x4  4. Anxiety and depression   5. ADHD  6. Obesity    Post-operative Diagnosis:   Same plus:   male living infant     Anestheisa: CSE    Antibiotics: 3g Ancef IV    QBL: 1156cc    UOP: 500ml clear urine in Florence bag after procedure    IV Fluids: 2700ml crystalloid    Specimens: placenta    Complications: none    Findings:  male infant in cephalic position  Apgar 1 min: 8  Apgar 5 min: 9  Weight: 3610g (7lb 15oz)     team present at delivery: yes    Nuchal cord: none  Amniotic fluid: clear  Placenta: spontaneous removal  Maternal anatomy: uterus, fallopian tubes, and ovaries appeared normal other than thin area on the left corner of the hysterotomy     Procedure Description:  After informed consent was obtained, the patient was brought to the operating room with IV in place. CSE and florence placed in OR. The patient was placed on the operating table in the dorsal supine position with a leftward tilt and fetal heart tones were confirmed. The patient was prepped and draped in usual sterile fashion. Antibiotics were given prior to incision.  A Pfannenstiel skin incision was made with the scalpel. This incision was carried down through the subcutaneous tissue to the fascia sharply. The fascia was then incised in the midline and this incision was extended laterally with Kitchen scissors. Using two Kocher clamps, the fascia was elevated and the underlying rectus muscles were dissected off bluntly and with Kitchen scissors both superiorly and inferiorly. The rectus muscles were  in the midline and the peritoneum was identified and entered sharply. The peritoneal incision was extended with sharp dissection  as the rectus were scarred together, and traction with good visualization of the bowel and bladder. The bladder blade was placed. The vesicouterine peritoneal reflection was taken down sharply and the bladder bluntly dissected off of the lower uterine segment. A low transverse uterine incision was made with the scalpel. The incision was extended laterally with digital traction. The bladder blade was removed and the infant was delivered as noted in findings above. The oropharynx and nares were bulb suctioned and the umbilical cord was doubly clamped and cut after 60 second delay. The infant was handed off to a waiting nurse and NNP. Appropriate specimens were obtained as noted above. The placenta was removed as noted above.  The uterus was exteriorized and cleared of clot and debris. Hemabate was given to prevent atony since the patient was on magnesium sulfate. The uterine incision was reappoximated with 0 PDS in a running stitch followed by 0 PDS in a running imbricating stitch for a 2 layer closure including the serosa that was able to be closed in the second layer. Multiple figure of eight stiches were needed at the left corner of the hysterotomy to bring together the thinned area which also included a vessel that was bleeding. Hemostasis was observed. The posterior cul-de-sac was irrigated and suctioned. The uterus was returned to anatomic position. The pericolic gutters were irrigated and suctioned. The uterine incision was reinspected and noted to be hemostatic.  The parietal peritoneum was reapproximated with 3-0 Vicryl in a running stitch which imbricated the rough edges outside of the abdomen. The rectus muscles were reapproximated with 0 PDS in  horizontal mattress stitches. The subfascial tissues and rectus muscles were examined, made hemostatic with cautery, inspected, and found to be hemostatic. The fascia was reapproximated with 0 looped PDS in a running stitch. The subcutaneous tissue was irrigated  and made hemostatic with the Bovie.  The subcutaneous tissue was reapproximated in 3 layers with 3-0 Vicryl in running horizontal mattress stitches.  The skin incision was reapproximated with 4-0 Vicryl and covered with skin glue.   Fundal pressure was used to express all vaginal clots. Rectal cytotec was given to prevent atony since the patient was on magnesium sulfate. The patient tolerated the procedure well.  Instrument, sponge, and needle counts were correct x 3.  The patient was taken to the recovery room in stable condition.  The baby was taken to the NICU for prematurity.     Staci Lei MD

## 2023-10-25 LAB
PATH REPORT.COMMENTS IMP SPEC: NORMAL
PATH REPORT.COMMENTS IMP SPEC: NORMAL
PATH REPORT.FINAL DX SPEC: NORMAL
PATH REPORT.GROSS SPEC: NORMAL
PATH REPORT.MICROSCOPIC SPEC OTHER STN: NORMAL
PATH REPORT.RELEVANT HX SPEC: NORMAL
PHOTO IMAGE: NORMAL

## 2023-10-25 PROCEDURE — 250N000011 HC RX IP 250 OP 636: Mod: JZ | Performed by: INTERNAL MEDICINE

## 2023-10-25 PROCEDURE — 120N000001 HC R&B MED SURG/OB

## 2023-10-25 PROCEDURE — 250N000013 HC RX MED GY IP 250 OP 250 PS 637: Performed by: OBSTETRICS & GYNECOLOGY

## 2023-10-25 RX ORDER — OXYCODONE HYDROCHLORIDE 5 MG/1
10 TABLET ORAL EVERY 4 HOURS PRN
Status: DISCONTINUED | OUTPATIENT
Start: 2023-10-25 | End: 2023-10-26 | Stop reason: HOSPADM

## 2023-10-25 RX ADMIN — IBUPROFEN 800 MG: 800 TABLET ORAL at 22:28

## 2023-10-25 RX ADMIN — ACETAMINOPHEN 975 MG: 325 TABLET ORAL at 09:00

## 2023-10-25 RX ADMIN — IBUPROFEN 800 MG: 800 TABLET ORAL at 09:59

## 2023-10-25 RX ADMIN — IBUPROFEN 800 MG: 800 TABLET ORAL at 04:19

## 2023-10-25 RX ADMIN — OXYCODONE HYDROCHLORIDE 5 MG: 5 TABLET ORAL at 11:29

## 2023-10-25 RX ADMIN — OXYCODONE HYDROCHLORIDE 10 MG: 5 TABLET ORAL at 19:29

## 2023-10-25 RX ADMIN — IBUPROFEN 800 MG: 800 TABLET ORAL at 16:00

## 2023-10-25 RX ADMIN — ACETAMINOPHEN 975 MG: 325 TABLET ORAL at 14:51

## 2023-10-25 RX ADMIN — CITALOPRAM HYDROBROMIDE 40 MG: 40 TABLET ORAL at 09:00

## 2023-10-25 RX ADMIN — ACETAMINOPHEN 975 MG: 325 TABLET ORAL at 00:32

## 2023-10-25 RX ADMIN — OXYCODONE HYDROCHLORIDE 5 MG: 5 TABLET ORAL at 00:32

## 2023-10-25 RX ADMIN — OXYCODONE HYDROCHLORIDE 5 MG: 5 TABLET ORAL at 04:19

## 2023-10-25 RX ADMIN — ENOXAPARIN SODIUM 40 MG: 40 INJECTION SUBCUTANEOUS at 19:29

## 2023-10-25 RX ADMIN — LABETALOL HYDROCHLORIDE 400 MG: 200 TABLET, FILM COATED ORAL at 09:00

## 2023-10-25 RX ADMIN — NIFEDIPINE 30 MG: 30 TABLET, EXTENDED RELEASE ORAL at 09:00

## 2023-10-25 RX ADMIN — OXYCODONE HYDROCHLORIDE 5 MG: 5 TABLET ORAL at 09:09

## 2023-10-25 RX ADMIN — NIFEDIPINE 30 MG: 30 TABLET, EXTENDED RELEASE ORAL at 22:28

## 2023-10-25 RX ADMIN — OXYCODONE HYDROCHLORIDE 10 MG: 5 TABLET ORAL at 15:32

## 2023-10-25 RX ADMIN — ACETAMINOPHEN 975 MG: 325 TABLET ORAL at 21:09

## 2023-10-25 ASSESSMENT — ACTIVITIES OF DAILY LIVING (ADL)
ADLS_ACUITY_SCORE: 18

## 2023-10-25 NOTE — PLAN OF CARE
Problem: Adult Inpatient Plan of Care  Goal: Plan of Care Review  Description: The Plan of Care Review/Shift note should be completed every shift.  The Outcome Evaluation is a brief statement about your assessment that the patient is improving, declining, or no change.  This information will be displayed automatically on your shift  note.  Outcome: Progressing    Patient had one elevated BP, before labetalol and nifedipine. Last BP following med administration wnl. Patient denies pre-eclampsia symptoms. Patellar reflexes brisk and +3, no clonus present.       Problem: Postpartum ( Delivery)  Goal: Optimal Pain Control and Function  Outcome: Progressing  Pain rated 6-8 on a scale of 0-10. Scheduled tylenol and ibuprofen given, and oxycodone increased to 10 mg q4h prn. Patient provided with wheelchair to transfer between maternity and NICU to visit infant. Pain medications offered on schedule.       Problem: Postpartum ( Delivery)  Goal: Effective Urinary Elimination  Outcome: Met    Problem: Postpartum ( Delivery)  Goal: Fluid and Electrolyte Balance  Outcome: Met  Problem: Postpartum ( Delivery)    Goal: Effective Bowel Elimination  Outcome: Met  Intervention: Enhance Bowel Motility and Elimination  Recent Flowsheet Documentation  Taken 10/25/2023 0900 by Dorie Echevarria, RN  Bowel Motility Enhancement:   ambulation promoted   fluid intake encouraged   oral intake encouraged  Bowel Elimination Promotion:   adequate fluid intake promoted   ambulation promoted   Senna held due to reported loose stools.

## 2023-10-25 NOTE — PLAN OF CARE
Patient is managing pain with Ibuprofen and Tylenol, and oxycodone 5 mg.   Postpartum assessment WNL, magnesium discontinued at 1900, bp has been normotensive until 430 am, bp was 145/83, denies symptoms of hypertension.  Ambulating independently to NICU, is pumping and has gotten some expressed breast milk.    Problem: Adult Inpatient Plan of Care  Goal: Absence of Hospital-Acquired Illness or Injury  Outcome: Met  Intervention: Prevent Skin Injury  Recent Flowsheet Documentation  Taken 10/24/2023 2214 by Staci Nixon RN  Body Position: position changed independently  Intervention: Prevent and Manage VTE (Venous Thromboembolism) Risk  Recent Flowsheet Documentation  Taken 10/24/2023 2214 by Staci Nixon RN  VTE Prevention/Management: (lovenox given.) other (see comments)  Intervention: Prevent Infection  Recent Flowsheet Documentation  Taken 10/24/2023 2214 by Staci Nixon RN  Infection Prevention:   hand hygiene promoted   rest/sleep promoted  Goal: Optimal Comfort and Wellbeing  Outcome: Progressing  Intervention: Provide Person-Centered Care  Recent Flowsheet Documentation  Taken 10/24/2023 2214 by Staci Nixon RN  Trust Relationship/Rapport:   reassurance provided   questions encouraged   questions answered   thoughts/feelings acknowledged   empathic listening provided  Goal: Readiness for Transition of Care  Outcome: Progressing     Problem: Hypertensive Disorders in Pregnancy  Goal: Patient-Fetal Stabilization  Outcome: Progressing     Problem: Postpartum ( Delivery)  Goal: Successful Parent Role Transition  Outcome: Progressing  Goal: Hemostasis  Outcome: Progressing  Goal: Effective Bowel Elimination  Outcome: Progressing  Goal: Fluid and Electrolyte Balance  Outcome: Progressing  Goal: Absence of Infection Signs and Symptoms  Outcome: Progressing  Goal: Optimal Pain Control and Function  Outcome: Progressing  Intervention: Prevent or Manage  Pain  Recent Flowsheet Documentation  Taken 10/24/2023 2214 by Staci Nixon RN  Perineal Care: perineal hygiene encouraged  Goal: Nausea and Vomiting Relief  Outcome: Progressing  Goal: Effective Urinary Elimination  Outcome: Progressing  Goal: Effective Oxygenation and Ventilation  Outcome: Progressing  Intervention: Optimize Oxygenation and Ventilation  Recent Flowsheet Documentation  Taken 10/24/2023 2214 by Staci Nixon RN  Head of Bed (HOB) Positioning: HOB at 30-45 degrees     Problem: Breastfeeding  Goal: Effective Breastfeeding  Outcome: Progressing   Goal Outcome Evaluation:

## 2023-10-25 NOTE — PROGRESS NOTES
10/25/23   Kiera Iraheta   1995    Postpartum Rounding Note    Subjective: Patient doing well. She is tolerating general diet, urinating without difficulty, and ambulating without lightheadedness. Vaginal bleeding is within normal limits. Denies headache, vision changes, trouble breathing, chest pain, or RUQ pain.      Vital signs:  Temp: 97.6  F (36.4  C) Temp src: Oral BP: 136/81 Pulse: 98   Resp: 16 SpO2: 99 % O2 Device: None (Room air)        Physical Exam:  General:   no distress  Abdomen: nontender, uterine fundus firm  below umbilicus, incision intact  Extremities: no calf pain, minimal symmetric edema in legs    UOP: 1000ml in 8hours      Labs: all recent labs reviewed      Assessment/Plan: 27 year old POD#2 s/p rLTCS on 10/23/23  1. Postop/postpartum               - Afebrile, VSS              - Pain well-controlled with Tylenol, Toradol, and PRN oxycodone              - Tolerating general diet              - UOP adequate, voiding spontaneously              - DVT ppx: lovenox 40mg every day   - Continue postpartum cares  2. cHTN with superimposed pre-eclampsia with severe features  - h/o cHTN              - severe by neuro symptoms and need for acute antihypertensives in the setting of cHTN              - symptomatic with HA and vision changes on admission, now asymptomatic              - BPs 110s-140s/50s-80s this AM              - acute antihypertensives: labetalol 20mg IV at 1416 on 10/23              - maintenance anti-hypertensives                          --labetalol 400mg q12h                          --procardiaXL 30mg q12h              - pre-e labs normal 10/23              - s/p magnesium sulfate for 24h PP (stopped 1900 10/24)  3. Anxiety and depression  - currently managed on Celexa 40 mg daily, and Seroquel 25 mg daily PRN  - history severe postpartum mood issues with psychosis after last delivery  - monitor mood sx closely  5. ADHD  - was on Adderall 30 mg daily - self weaned at 30  weeks  6. Obesity  - pre-pregnancy BMI >40, BMI 50.2  - Fetal Echo normal 7/7/23  - DVT ppx:  lovenox 40mg every day     Reviewed placental pathology which showed evidence of preeclampsia and decreased blood flow.        Discussed plan of care with patient, and patient expressed understanding. Opportunity for questions given, and all questions were answered.     Disposition: plan for discharge at least 24 hours of normotension after stopping magnesium sulfate.     Staci Lei MD

## 2023-10-26 VITALS
RESPIRATION RATE: 19 BRPM | BODY MASS INDEX: 45.99 KG/M2 | OXYGEN SATURATION: 98 % | HEIGHT: 67 IN | WEIGHT: 293 LBS | HEART RATE: 86 BPM | TEMPERATURE: 98 F | SYSTOLIC BLOOD PRESSURE: 117 MMHG | DIASTOLIC BLOOD PRESSURE: 71 MMHG

## 2023-10-26 PROCEDURE — 250N000013 HC RX MED GY IP 250 OP 250 PS 637: Performed by: OBSTETRICS & GYNECOLOGY

## 2023-10-26 RX ORDER — DOCUSATE SODIUM 100 MG/1
100 CAPSULE, LIQUID FILLED ORAL 2 TIMES DAILY
Qty: 30 CAPSULE | Refills: 1 | Status: SHIPPED | OUTPATIENT
Start: 2023-10-26

## 2023-10-26 RX ORDER — OXYCODONE HYDROCHLORIDE 5 MG/1
5-10 TABLET ORAL EVERY 6 HOURS PRN
Qty: 18 TABLET | Refills: 0 | Status: SHIPPED | OUTPATIENT
Start: 2023-10-26 | End: 2023-10-29

## 2023-10-26 RX ORDER — LABETALOL 200 MG/1
400 TABLET, FILM COATED ORAL 2 TIMES DAILY
Qty: 28 TABLET | Refills: 0 | Status: SHIPPED | OUTPATIENT
Start: 2023-10-26

## 2023-10-26 RX ORDER — IBUPROFEN 800 MG/1
800 TABLET, FILM COATED ORAL EVERY 8 HOURS PRN
Qty: 21 TABLET | Refills: 0 | Status: SHIPPED | OUTPATIENT
Start: 2023-10-26

## 2023-10-26 RX ADMIN — IBUPROFEN 800 MG: 800 TABLET ORAL at 04:22

## 2023-10-26 RX ADMIN — SENNOSIDES AND DOCUSATE SODIUM 2 TABLET: 8.6; 5 TABLET ORAL at 09:01

## 2023-10-26 RX ADMIN — ACETAMINOPHEN 975 MG: 325 TABLET ORAL at 09:01

## 2023-10-26 RX ADMIN — OXYCODONE HYDROCHLORIDE 10 MG: 5 TABLET ORAL at 06:45

## 2023-10-26 RX ADMIN — CITALOPRAM HYDROBROMIDE 40 MG: 40 TABLET ORAL at 09:00

## 2023-10-26 RX ADMIN — LABETALOL HYDROCHLORIDE 400 MG: 200 TABLET, FILM COATED ORAL at 09:00

## 2023-10-26 RX ADMIN — ACETAMINOPHEN 975 MG: 325 TABLET ORAL at 02:40

## 2023-10-26 RX ADMIN — OXYCODONE HYDROCHLORIDE 10 MG: 5 TABLET ORAL at 10:29

## 2023-10-26 RX ADMIN — NIFEDIPINE 30 MG: 30 TABLET, EXTENDED RELEASE ORAL at 09:01

## 2023-10-26 RX ADMIN — OXYCODONE HYDROCHLORIDE 10 MG: 5 TABLET ORAL at 00:10

## 2023-10-26 RX ADMIN — IBUPROFEN 800 MG: 800 TABLET ORAL at 10:29

## 2023-10-26 ASSESSMENT — ACTIVITIES OF DAILY LIVING (ADL)
ADLS_ACUITY_SCORE: 18

## 2023-10-26 NOTE — PROGRESS NOTES
10/26/23   Kiera Iraheta   1995    Postpartum Rounding Note    Subjective: Patient doing well. She is tolerating general diet, urinating without difficulty, and ambulating without lightheadedness. She is breastfeeding. Vaginal bleeding is within normal limits. Positive flatus and BM. Denies headache, vision changes, trouble breathing, chest pain, or RUQ pain. She feels ready to go home today.       Vital signs:  Vitals:    10/25/23 2109 10/26/23 0009 10/26/23 0422 10/26/23 0901   BP: 114/62 120/77 117/54 (!) 146/74   BP Location:  Right arm Right arm Right arm   Patient Position:   Left side    Cuff Size:   Adult Regular    Pulse:  91 86 101   Resp:  20 18 19   Temp:  98.3  F (36.8  C)  98  F (36.7  C)   TempSrc:  Oral  Oral   SpO2:  99% 97% 98%   Weight:       Height:         Physical Exam:  General:   no distress  Abdomen: nontender, uterine fundus firm  below umbilicus, incision intact  Extremities: no calf pain, minimal symmetric edema in legs    Recent Labs   Lab Test 10/24/23  0749 10/23/23  1406 10/23/23  1155 09/05/23  1743 07/28/23  2024 07/28/22  0747 07/27/22  0917 07/26/22  1939 12/10/20  0621 12/09/20  0131 12/07/20  0110   WBC 9.6 12.4* 10.6 10.8 10.4  --   --  13.7* 10.2   < > 11.0   HGB 9.6* 9.9* 11.5* 10.9* 12.3 10.5*  --  11.4* 9.8*   < > 11.4*   HCT 29.2* 30.5* 35.0 33.1* 35.2  --   --  34.8* 31.0*   < > 35.1    412 366 343 327  --  408 355 295   < > 363   *  --  136 135* 135*  --   --  137  --   --   --    CR 0.62  --  0.52 0.53 0.47*  --   --  0.63 0.68  --  0.63   AST 28  --  19 17 25  --   --  15 21  --  17   ALT 14  --  14 9 17  --   --  13 14  --  14    < > = values in this interval not displayed.         Assessment/Plan: 27 year old POD#3 s/p rLTCS on 10/23/23  1. Postop/postpartum               - Afebrile, VSS              - Pain well-controlled with Tylenol, Toradol, and PRN oxycodone              - Tolerating general diet              - Voiding spontaneously               - DVT ppx: lovenox 40mg qd  - Continue postpartum cares  2. cHTN with superimposed pre-eclampsia with severe features  - h/o cHTN              - severe by neuro symptoms and need for acute antihypertensives in the setting of cHTN              - symptomatic with HA and vision changes on admission, now asymptomatic              - BPs 110s-140s/50s-80s this AM (primarily 110s-120s/50s-70s with x1 146/74 after pt had been walking)              - acute antihypertensives: labetalol 20mg IV at 1416 on 10/23              - maintenance anti-hypertensives                          --labetalol 400mg q12h                          --procardiaXL 30mg q12h              - pre-e labs normal 10/23              - s/p magnesium sulfate for 24h PP (stopped 1900 10/24)  3. Anxiety and depression  - currently managed on Celexa 40 mg daily, and Seroquel 25 mg daily PRN  - history severe postpartum mood issues with psychosis after last delivery  - monitor mood sx closely  5. ADHD  - was on Adderall 30 mg daily - self weaned at 30 weeks  6. Obesity  - pre-pregnancy BMI >40, BMI 50.2  - Fetal Echo normal 7/7/23  - DVT ppx:  lovenox 40mg qd    Discussed plan of care with patient and reviewed discharge instruction, and patient expressed understanding. Opportunity for questions given, and all questions were answered.     Disposition: plan for discharge home later today pending blood pressures. Follow-up in the office in 1 week.       Kathy Frausto MD

## 2023-10-26 NOTE — DISCHARGE SUMMARY
Discharge summary    10/26/23   Kiera Iraheta   1995    Admit date: 10/23/2023     Discharge date: 10/26/23      Admit diagnoses:  1. 27 year old  at 35w5d  2. Chronic HTN with superimposed preeclampsia with severe features  3. H/o  x4  4. Anxiety  5. Depression  6. ADHD  7. Obesity    Discharge diagnoses:  1. Live male infant born at 35w5d  2. Chronic HTN with superimposed preeclampsia with severe features  3. S/p repeat   4. Anxiety  5. Depression  6. ADHD  7. Obesity    Hospital course:   The patient was admitted at 35w5d for concern for superimposed preeclampsia with severe features in the setting of chronic HTN when she had endorsed noticing scotoma in the office as well as a headache. She was admitted and while at the hospital, developed severe range blood pressures. She was started on magnesium sulfate for seizure prophylaxis. See H&P for details. The patient had a live male infant by repeat  at 35w5d; see operative report for details. After delivery, she was tolerating a normal diet, voiding urine without difficulty, ambulating without lightheadedness, and her vaginal bleeding was within normal limits for postpartum. She continued on magnesium sulfate for 24 hours postpartum and remained asymptomatic. Her blood pressures were controlled with her  regimen labetolol 400 mg q12h and procardia XL 30 mg q12h. She was discharged to home on postpartum day 3.    Rubella immune  Rh positive    Disposition: Discharged to home with follow-up in office in 1 week.    Kathy Frausto MD

## 2023-10-26 NOTE — PLAN OF CARE
Problem: Postpartum ( Delivery)  Goal: Successful Parent Role Transition  Outcome: Progressing     Problem: Postpartum ( Delivery)  Goal: Optimal Pain Control and Function  Outcome: Progressing     Problem: Breastfeeding  Goal: Effective Breastfeeding  Outcome: Progressing   Goal Outcome Evaluation:               Vitals WNL.  No sx of Pre-E  Breastfeeding and pumping.   Excess milk stored in nursery fridge  Rates pain 3-6/10 - managed with tylenol/ibuprofen and 2 dose2 prn oxy.

## 2023-10-26 NOTE — DISCHARGE INSTRUCTIONS
Warning Signs after Having a Baby    Keep this paper on your fridge or somewhere else where you can see it.    Call your provider if you have any of these symptoms up to 12 weeks after having your baby.    Thoughts of hurting yourself or your baby  Pain in your chest or trouble breathing  Severe headache not helped by pain medicine  Eyesight concerns (blurry vision, seeing spots or flashes of light, other changes to eyesight)  Fainting, shaking or other signs of a seizure    Call 9-1-1 if you feel that it is an emergency.     The symptoms below can happen to anyone after giving birth. They can be very serious. Call your provider if you have any of these warning signs.    My provider s phone number: _______________________    Losing too much blood (hemorrhage)    Call your provider if you soak through a pad in less than an hour or pass blood clots bigger than a golf ball. These may be signs that you are bleeding too much.    Blood clots in the legs or lungs    After you give birth, your body naturally clots its blood to help prevent blood loss. Sometimes this increased clotting can happen in other areas of the body, like the legs or lungs. This can block your blood flow and be very dangerous.     Call your provider if you:  Have a red, swollen spot on the back of your leg that is warm or painful when you touch it.   Are coughing up blood.     Infection    Call your provider if you have any of these symptoms:  Fever of 100.4 F (38 C) or higher.  Pain or redness around your stitches if you had an incision.   Any yellow, white, or green fluid coming from places where you had stitches or surgery.    Mood Problems (postpartum depression)    Many people feel sad or have mood changes after having a baby. But for some people, these mood swings are worse.     Call your provider right away if you feel so anxious or nervous that you can't care for yourself or your baby.    Preeclampsia (high blood pressure)    Even if you  didn't have high blood pressure when you were pregnant, you are at risk for the high blood pressure disease called preeclampsia. This risk can last up to 12 weeks after giving birth.     Call your provider if you have:   Pain on your right side under your rib cage  Sudden swelling in the hands and face    Remember: You know your body. If something doesn't feel right, get medical help.     For informational purposes only. Not to replace the advice of your health care provider. Copyright 2020 [facility]. All rights reserved. Clinically reviewed by Beatriz Brooks, RNC-OB, MSN. [facility] [id number] - Rev .        [facility]  Discharge Instructions:    Congratulations on the birth of your baby boy!    Because you have a large abdominal wound, please do not lift heavier than fifteen pounds for six weeks. You should let someone else push the vacuum sweeper and lift laundry baskets for six weeks.  Do not drive while you have significant pain. Before driving, sit in the car with it turned off and press the brakes quickly; if you are able to do this without significant pain, it is ok to drive.   Do not drive if you are taking narcotic pain medications (oxycodone, hydrocodone); wait until 24 hours after the last dose.    Please call if your bleeding is bright red more than a pad an hour and doesn't seem to be slowing down.   Please call if your temperature goes above 100 degrees.   Please call if your incision is red, hot, painful, swells, or has puss coming out of it.     You may use Tylenol 1000mg every 8 hours and/or Ibuprofen 800mg every 8 hours for pain. If needed, you may take oxycodone 5mg every 6 hours. Take a stool softener (such as docusate sodium 100mg 1-2 times daily) if you take oxycodone.     Recommend taking prenatal vitamin, vitamin D 2000 units/day, and fish oil with DHA while you are breast-feeding. Assure you are eating adequate amounts of protein (about 75g per day if  breastfeeding without supplementation during the first 6 months of your baby's life) as well as healthy fats (avocado, olive oil, avocado oil, coconut oil) while breast-feeding.    Call [phone] for questions during the day and for emergencies on nights and weekends.    Please make an appointment in one, two, and six weeks.     Recommend taking iron daily. Seeking [employer] Optimal Iron is a good option that has cofactors to help the iron be absorbed and decrease the side effects of taking iron.      [facility] Blood Pressure Instructions After Delivery:    Check BP   - Continue to take your blood pressure medications  - Your nurse will show you how to use your blood pressure cuff prior to discharge.  - Take your blood pressure 3 times per day (morning, midday, and evening) and record the readings. Make sure that you are seated with your legs uncrossed for at least 15 minutes and that you have not had any caffeine in the last 30 minutes.  - Check blood pressure before taking medication, if the top number is 120 or less, do not take your medication.  --If the top number (systolic) is over 140, take your medication and recheck 1 hour later, if it remains above 140 call the office.   --If the top number is over 150 or the bottom number (diastolic) is over 100, call the office.  --If the top number is over 160 or the bottom number is over 110, recheck your blood pressure, and if it is still this high, call the on-call number right away.    Pre-eclampsia symptoms  - Please call right away if you have a headache that does not go away with Tylenol and Ibuprofen, vision changes, trouble breathing, chest pain, or pain in your right side or underneath your sternum.    Follow-up  - Call the clinic to schedule an appointment in 1 week for a blood pressure check and then again for a 6 week postpartum visit.      Kathy Frausto MD

## 2023-10-28 ENCOUNTER — PATIENT OUTREACH (OUTPATIENT)
Dept: CARE COORDINATION | Facility: CLINIC | Age: 28
End: 2023-10-28
Payer: COMMERCIAL

## 2023-10-28 NOTE — PROGRESS NOTES
Connected [facility] Resource Center Contact  [facility]/Voicemail     Clinical Data: Post-Discharge Outreach     Outreach attempted x 2.  Left message on patient's voicemail, providing [facility]'s central phone number of [phone] ([phone]) for questions/concerns and/or to schedule an appt with an [facility] provider, if they do not have a PCP.      Plan:  [facility] will do no further outreaches at this time.       Bernie Pena RN  [facility], [facility]    *Connected Care Resource Team does NOT follow patient ongoing. Referrals are identified based on internal discharge reports and the outreach is to ensure patient has an understanding of their discharge instructions.

## 2023-10-29 DIAGNOSIS — Z98.891 S/P REPEAT LOW TRANSVERSE C-SECTION: Primary | ICD-10-CM

## 2023-10-29 RX ORDER — OXYCODONE HYDROCHLORIDE 5 MG/1
5-10 TABLET ORAL EVERY 6 HOURS PRN
Qty: 16 TABLET | Refills: 0 | Status: SHIPPED | OUTPATIENT
Start: 2023-10-29 | End: 2023-11-01

## 2024-09-22 ENCOUNTER — HEALTH MAINTENANCE LETTER (OUTPATIENT)
Age: 29
End: 2024-09-22

## (undated) DEVICE — Device

## (undated) DEVICE — SUTURE PDS 0 60IN CTX+ LOOPED PDP990G

## (undated) DEVICE — SUTURE VICRYL+ 3-0 27IN CT-1 UND VCP258H

## (undated) DEVICE — PREP CHLORAPREP 26ML TINTED HI-LITE ORANGE 930815

## (undated) DEVICE — PACK MINOR SINGLE BASIN SSK3001

## (undated) DEVICE — DRSG PRIMAPORE 03 1/8X6" 66000318

## (undated) DEVICE — SOL WATER IRRIG 1000ML BOTTLE 2F7114

## (undated) DEVICE — ADH LIQUID MASTISOL TOPICAL VIAL 2-3ML 0523-48

## (undated) DEVICE — DRESSING MEPILEX AG SILVER 4X12 395990

## (undated) DEVICE — GOWN IMPERVIOUS BREATHABLE SMART LG 89015

## (undated) DEVICE — STPL SKIN SUBCUTICULAR INSORB  2030

## (undated) DEVICE — PACK MAJOR BASIN 673

## (undated) DEVICE — DRSG STERI STRIP 1/2X4" R1547

## (undated) DEVICE — GLOVE BIOGEL PI INDICATOR 8.0 LF 41680

## (undated) DEVICE — GLOVE BIOGEL PI ULTRATOUCH G SZ 7.5 42175

## (undated) DEVICE — SU VICRYL 3-0 CT 36" J356H

## (undated) DEVICE — PLATE GROUNDING ADULT W/CORD 9165L

## (undated) DEVICE — RETR PANNICULUS TRAXI FOR PT POSITIONING PRS-1030

## (undated) DEVICE — CUSTOM PACK C-SECTION LHE

## (undated) DEVICE — SUCTION MANIFOLD NEPTUNE 2 SYS 1 PORT 702-025-000

## (undated) DEVICE — SU PDS II 0 CT-1 27" Z340H

## (undated) DEVICE — SUTURE VICRYL+ 0 36IN CT-1 UND VCP946H

## (undated) DEVICE — SU PDS II 0 CTX 60" Z990G

## (undated) DEVICE — SOL NACL 0.9% IRRIG 1000ML BOTTLE 2F7124

## (undated) DEVICE — SU VICRYL 3-0 FS-1 27" J442H

## (undated) DEVICE — SU CHROMIC 0 CT 36" 914H

## (undated) DEVICE — SUCTION TIP POOLE STERILE 35040

## (undated) DEVICE — DRAPE STERI CESAREAN W/POUCH 7966

## (undated) RX ORDER — PHENYLEPHRINE HYDROCHLORIDE 10 MG/ML
INJECTION INTRAVENOUS
Status: DISPENSED
Start: 2022-07-27

## (undated) RX ORDER — BUPIVACAINE HYDROCHLORIDE 7.5 MG/ML
INJECTION, SOLUTION INTRASPINAL
Status: DISPENSED
Start: 2023-10-23

## (undated) RX ORDER — PHENYLEPHRINE HYDROCHLORIDE 10 MG/ML
INJECTION INTRAVENOUS
Status: DISPENSED
Start: 2023-10-23

## (undated) RX ORDER — MORPHINE SULFATE 1 MG/ML
INJECTION, SOLUTION EPIDURAL; INTRATHECAL; INTRAVENOUS
Status: DISPENSED
Start: 2023-10-23

## (undated) RX ORDER — FENTANYL CITRATE 50 UG/ML
INJECTION, SOLUTION INTRAMUSCULAR; INTRAVENOUS
Status: DISPENSED
Start: 2023-10-23

## (undated) RX ORDER — MORPHINE SULFATE 1 MG/ML
INJECTION, SOLUTION EPIDURAL; INTRATHECAL; INTRAVENOUS
Status: DISPENSED
Start: 2022-07-27